# Patient Record
Sex: MALE | Race: WHITE | NOT HISPANIC OR LATINO | URBAN - METROPOLITAN AREA
[De-identification: names, ages, dates, MRNs, and addresses within clinical notes are randomized per-mention and may not be internally consistent; named-entity substitution may affect disease eponyms.]

---

## 2017-07-03 ENCOUNTER — INPATIENT (INPATIENT)
Facility: HOSPITAL | Age: 57
LOS: 6 days | Discharge: HOME | DRG: 885 | End: 2017-07-10
Attending: STUDENT IN AN ORGANIZED HEALTH CARE EDUCATION/TRAINING PROGRAM | Admitting: STUDENT IN AN ORGANIZED HEALTH CARE EDUCATION/TRAINING PROGRAM
Payer: MEDICARE

## 2017-07-03 VITALS
DIASTOLIC BLOOD PRESSURE: 103 MMHG | TEMPERATURE: 99 F | HEART RATE: 118 BPM | OXYGEN SATURATION: 96 % | SYSTOLIC BLOOD PRESSURE: 159 MMHG | WEIGHT: 195.11 LBS | RESPIRATION RATE: 16 BRPM | HEIGHT: 74 IN

## 2017-07-03 DIAGNOSIS — Z98.89 OTHER SPECIFIED POSTPROCEDURAL STATES: Chronic | ICD-10-CM

## 2017-07-03 DIAGNOSIS — F33.2 MAJOR DEPRESSIVE DISORDER, RECURRENT SEVERE WITHOUT PSYCHOTIC FEATURES: ICD-10-CM

## 2017-07-03 DIAGNOSIS — R69 ILLNESS, UNSPECIFIED: ICD-10-CM

## 2017-07-03 LAB
ANION GAP SERPL CALC-SCNC: 14 MMOL/L — SIGNIFICANT CHANGE UP (ref 5–17)
APPEARANCE UR: CLEAR — SIGNIFICANT CHANGE UP
BILIRUB UR-MCNC: NEGATIVE — SIGNIFICANT CHANGE UP
BUN SERPL-MCNC: 18 MG/DL — SIGNIFICANT CHANGE UP (ref 7–23)
CALCIUM SERPL-MCNC: 10 MG/DL — SIGNIFICANT CHANGE UP (ref 8.4–10.5)
CHLORIDE SERPL-SCNC: 102 MMOL/L — SIGNIFICANT CHANGE UP (ref 96–108)
CO2 SERPL-SCNC: 24 MMOL/L — SIGNIFICANT CHANGE UP (ref 22–31)
COLOR SPEC: YELLOW — SIGNIFICANT CHANGE UP
CREAT SERPL-MCNC: 0.9 MG/DL — SIGNIFICANT CHANGE UP (ref 0.5–1.3)
DIFF PNL FLD: (no result)
GLUCOSE SERPL-MCNC: 116 MG/DL — HIGH (ref 70–99)
GLUCOSE UR QL: NEGATIVE — SIGNIFICANT CHANGE UP
HCT VFR BLD CALC: 48.9 % — SIGNIFICANT CHANGE UP (ref 39–50)
HGB BLD-MCNC: 17.6 G/DL — HIGH (ref 13–17)
KETONES UR-MCNC: NEGATIVE — SIGNIFICANT CHANGE UP
LEUKOCYTE ESTERASE UR-ACNC: NEGATIVE — SIGNIFICANT CHANGE UP
MCHC RBC-ENTMCNC: 31.5 PG — SIGNIFICANT CHANGE UP (ref 27–34)
MCHC RBC-ENTMCNC: 36 G/DL — SIGNIFICANT CHANGE UP (ref 32–36)
MCV RBC AUTO: 87.6 FL — SIGNIFICANT CHANGE UP (ref 80–100)
NITRITE UR-MCNC: NEGATIVE — SIGNIFICANT CHANGE UP
PCP SPEC-MCNC: SIGNIFICANT CHANGE UP
PH UR: 5.5 — SIGNIFICANT CHANGE UP (ref 5–8)
PLATELET # BLD AUTO: 228 K/UL — SIGNIFICANT CHANGE UP (ref 150–400)
POTASSIUM SERPL-MCNC: 4.2 MMOL/L — SIGNIFICANT CHANGE UP (ref 3.5–5.3)
POTASSIUM SERPL-SCNC: 4.2 MMOL/L — SIGNIFICANT CHANGE UP (ref 3.5–5.3)
PROT UR-MCNC: NEGATIVE MG/DL — SIGNIFICANT CHANGE UP
RBC # BLD: 5.58 M/UL — SIGNIFICANT CHANGE UP (ref 4.2–5.8)
RBC # FLD: 12.6 % — SIGNIFICANT CHANGE UP (ref 10.3–16.9)
SODIUM SERPL-SCNC: 140 MMOL/L — SIGNIFICANT CHANGE UP (ref 135–145)
SP GR SPEC: >=1.03 — SIGNIFICANT CHANGE UP (ref 1–1.03)
UROBILINOGEN FLD QL: 0.2 E.U./DL — SIGNIFICANT CHANGE UP
WBC # BLD: 10.7 K/UL — HIGH (ref 3.8–10.5)
WBC # FLD AUTO: 10.7 K/UL — HIGH (ref 3.8–10.5)

## 2017-07-03 PROCEDURE — 99285 EMERGENCY DEPT VISIT HI MDM: CPT

## 2017-07-03 PROCEDURE — 99285 EMERGENCY DEPT VISIT HI MDM: CPT | Mod: 25

## 2017-07-03 PROCEDURE — 93010 ELECTROCARDIOGRAM REPORT: CPT

## 2017-07-03 RX ORDER — CLONAZEPAM 1 MG
0.5 TABLET ORAL
Qty: 0 | Refills: 0 | Status: DISCONTINUED | OUTPATIENT
Start: 2017-07-03 | End: 2017-07-04

## 2017-07-03 RX ORDER — DESVENLAFAXINE 50 MG/1
50 TABLET, EXTENDED RELEASE ORAL DAILY
Qty: 0 | Refills: 0 | Status: DISCONTINUED | OUTPATIENT
Start: 2017-07-03 | End: 2017-07-03

## 2017-07-03 RX ORDER — OXAZEPAM 10 MG
10 CAPSULE ORAL THREE TIMES A DAY
Qty: 0 | Refills: 0 | Status: DISCONTINUED | OUTPATIENT
Start: 2017-07-03 | End: 2017-07-03

## 2017-07-03 RX ORDER — QUETIAPINE FUMARATE 200 MG/1
25 TABLET, FILM COATED ORAL EVERY 6 HOURS
Qty: 0 | Refills: 0 | Status: DISCONTINUED | OUTPATIENT
Start: 2017-07-03 | End: 2017-07-07

## 2017-07-03 RX ORDER — DESVENLAFAXINE 50 MG/1
50 TABLET, EXTENDED RELEASE ORAL AT BEDTIME
Qty: 0 | Refills: 0 | Status: DISCONTINUED | OUTPATIENT
Start: 2017-07-03 | End: 2017-07-03

## 2017-07-03 RX ORDER — DESVENLAFAXINE 50 MG/1
50 TABLET, EXTENDED RELEASE ORAL DAILY
Qty: 0 | Refills: 0 | Status: DISCONTINUED | OUTPATIENT
Start: 2017-07-03 | End: 2017-07-10

## 2017-07-03 RX ORDER — LISINOPRIL 2.5 MG/1
20 TABLET ORAL ONCE
Qty: 0 | Refills: 0 | Status: COMPLETED | OUTPATIENT
Start: 2017-07-03 | End: 2017-07-03

## 2017-07-03 RX ORDER — QUETIAPINE FUMARATE 200 MG/1
12.5 TABLET, FILM COATED ORAL EVERY 4 HOURS
Qty: 0 | Refills: 0 | Status: DISCONTINUED | OUTPATIENT
Start: 2017-07-03 | End: 2017-07-03

## 2017-07-03 RX ORDER — SODIUM CHLORIDE 9 MG/ML
1000 INJECTION INTRAMUSCULAR; INTRAVENOUS; SUBCUTANEOUS ONCE
Qty: 0 | Refills: 0 | Status: COMPLETED | OUTPATIENT
Start: 2017-07-03 | End: 2017-07-03

## 2017-07-03 RX ORDER — QUETIAPINE FUMARATE 200 MG/1
12.5 TABLET, FILM COATED ORAL
Qty: 0 | Refills: 0 | Status: DISCONTINUED | OUTPATIENT
Start: 2017-07-03 | End: 2017-07-03

## 2017-07-03 RX ORDER — LISDEXAMFETAMINE DIMESYLATE 70 MG/1
70 CAPSULE ORAL DAILY
Qty: 0 | Refills: 0 | Status: DISCONTINUED | OUTPATIENT
Start: 2017-07-03 | End: 2017-07-04

## 2017-07-03 RX ORDER — QUETIAPINE FUMARATE 200 MG/1
25 TABLET, FILM COATED ORAL AT BEDTIME
Qty: 0 | Refills: 0 | Status: DISCONTINUED | OUTPATIENT
Start: 2017-07-03 | End: 2017-07-10

## 2017-07-03 RX ORDER — LISINOPRIL 2.5 MG/1
20 TABLET ORAL DAILY
Qty: 0 | Refills: 0 | Status: DISCONTINUED | OUTPATIENT
Start: 2017-07-03 | End: 2017-07-04

## 2017-07-03 RX ORDER — ATORVASTATIN CALCIUM 80 MG/1
10 TABLET, FILM COATED ORAL AT BEDTIME
Qty: 0 | Refills: 0 | Status: DISCONTINUED | OUTPATIENT
Start: 2017-07-03 | End: 2017-07-10

## 2017-07-03 RX ORDER — LISDEXAMFETAMINE DIMESYLATE 70 MG/1
70 CAPSULE ORAL DAILY
Qty: 0 | Refills: 0 | Status: DISCONTINUED | OUTPATIENT
Start: 2017-07-03 | End: 2017-07-03

## 2017-07-03 RX ADMIN — QUETIAPINE FUMARATE 12.5 MILLIGRAM(S): 200 TABLET, FILM COATED ORAL at 15:49

## 2017-07-03 RX ADMIN — LISINOPRIL 20 MILLIGRAM(S): 2.5 TABLET ORAL at 10:13

## 2017-07-03 RX ADMIN — SODIUM CHLORIDE 1000 MILLILITER(S): 9 INJECTION INTRAMUSCULAR; INTRAVENOUS; SUBCUTANEOUS at 10:13

## 2017-07-03 RX ADMIN — Medication 2 MILLIGRAM(S): at 10:13

## 2017-07-03 RX ADMIN — ATORVASTATIN CALCIUM 10 MILLIGRAM(S): 80 TABLET, FILM COATED ORAL at 22:21

## 2017-07-03 RX ADMIN — QUETIAPINE FUMARATE 25 MILLIGRAM(S): 200 TABLET, FILM COATED ORAL at 22:21

## 2017-07-03 RX ADMIN — DESVENLAFAXINE 50 MILLIGRAM(S): 50 TABLET, EXTENDED RELEASE ORAL at 17:26

## 2017-07-03 RX ADMIN — Medication 0.5 MILLIGRAM(S): at 22:21

## 2017-07-03 NOTE — ED BEHAVIORAL HEALTH ASSESSMENT NOTE - RISK ASSESSMENT
Pt is stable but has chronic risk based on severe depression, though has multiple protective factors including roberto, and hx w/o suicidality or hospitalization despite severity and tx refractoriness of condition.

## 2017-07-03 NOTE — ED BEHAVIORAL HEALTH ASSESSMENT NOTE - CURRENT MEDICATION
Vyvanse 70 mg daily; Pristiq 50 mg daily; Serax 30/30/15 mg; Seroquel- prescribed 12.5/ 25 mg most recently, but has been using with psychiatrist as needed 25 mg ~ 4x daily; lisinopril 20 mg daily; Lipitor 10 mg daily

## 2017-07-03 NOTE — ED PROVIDER NOTE - OBJECTIVE STATEMENT
58 y/o M w/ pmhx of YEHUDA and MDD for 30 years, HTN, HLD, presents to ED today for possible ECT treatment. Pt has been mostly bedridden > 1 month due to severe anxiety, fear, depression, lives w/ elderly parents, has spoken to both psychiatrist (Dr. Garay) and therapist (Emely Melgar) about possibility of ECT and is amenable. He states that Dr. Garay, his psychiatrist in [place], spoke to Dr. Fulton at [facility] about admission for ECT. He denies thoughts of hurting himself, SI/HI, denies hx of psychiatric admissions, denies hx of etoh/recreational drug use (former smoker, quit 5 years ago). This morning forgot to take lisinopril for HTN, has taken Vyvanse (which normally increases his heart rate) 70mg, quetiapine 25mg, as well as 30mg x 2 of oxazepam.    On review of systems, endorses current anxiety/depression, occasional SOB w/ anxiety attacks as well as occasional dizziness with medications. Denies fever, chest pain, nausea/vomiting, diarrhea/constipation, rash, headache/visual changes.

## 2017-07-03 NOTE — ED BEHAVIORAL HEALTH ASSESSMENT NOTE - DETAILS
as noted, passive suicidal ideation of not wanting to be on this earth, but not ruminative, no active suicidal ideation ever Zoloft, nortriptyline- cardiac informed nursing and covering attending d/w Dr. Garay, psychiatrist

## 2017-07-03 NOTE — ED PROVIDER NOTE - MEDICAL DECISION MAKING DETAILS
58 y/o M w/ pmhx of YEHUDA and MDD for 30 years, HTN, HLD, presents to ED today with severe anxiety/depression refractory to therapy/medications, sent in by psychiatrist for possible ECT treatment.  -Patient amenable to ECT treatment, no SI/HI, consult psychiatry and f/up recs  -Lisinopril 20mg x 1 (did not take today) for elevated BP, tachycardia likely 2/2 Vyvanse and/or dehydration - 1L NS bolus, ativan 2mg IV x 1 for anxiety (states oxazepam this AM did not help) 56 y/o M w/ pmhx of YEHUDA and MDD for 30 years, HTN, HLD, presents to ED today with severe anxiety/depression refractory to therapy/medications, sent in by psychiatrist for possible ECT treatment.  -Patient amenable to ECT treatment, no SI/HI, consult psychiatry and f/up recs  -Lisinopril 20mg x 1 (did not take today) for elevated BP, tachycardia likely 2/2 Vyvanse and/or dehydration - 1L NS bolus, ativan 2mg IV x 1 for anxiety (states oxazepam this AM did not help)  -VS improved on re-check, admit to psychiatry for ECT

## 2017-07-03 NOTE — ED BEHAVIORAL HEALTH ASSESSMENT NOTE - PSYCHIATRIC ISSUES AND PLAN (INCLUDE STANDING AND PRN MEDICATION)
plan to evaluate for inpatient ECT; Serax tapered to 10 mg TID for the remainder of today and tomorrow then further dosing to be addressed by inpatient team with prospect of treatment starting in 2 days; quetiapine ordered at 12.5 mg 9a/1p/5p for anxiety but at lower dose to limit sedation, and 25 mg at bedtime; c/w Vyvanse 70 mg daily for now and Pristiq 50 mg daily which is ordered to limit w/d but may be stopped; TCA (doxepin) vs. MAOI trial as pharmacologic trial I+/- maintenance ECT in the

## 2017-07-03 NOTE — ED BEHAVIORAL HEALTH ASSESSMENT NOTE - SUMMARY
56yo DWM on SSD x 1 year, with 3oy h/o TRD, severe, with fluctuating course including paroxysmal onset and recovery of episode per pt, and worse with prolonged 2mo episode described by pt since 14mo interval of relative euthymia after break-up with g.f. of 2 yrs ~ 5 mos ago, being admitted to pursue evaluation and for trial of treatment with inpatient ECT.

## 2017-07-03 NOTE — ED BEHAVIORAL HEALTH ASSESSMENT NOTE - DESCRIPTION (FIRST USE, LAST USE, QUANTITY, FREQUENCY, DURATION)
occasional, never routine or excessive, one glass of wine weekly with ex-g.f. at dinner, last 4 mos ago past experience, not regular or excessive; last 2 mos ago, first time as means for relaxation experimented with it 1-2x in lifetime, last a few yrs ago prescribed, multiple trials, ineffective, used as prescribed; no excessive or rec use or abuse

## 2017-07-03 NOTE — ED BEHAVIORAL HEALTH ASSESSMENT NOTE - MEDICAL ISSUES AND PLAN (INCLUDE STANDING AND PRN MEDICATION)
med consult for ECT clearance; continue home medication at current dose- lisinopril, of which dose was received in ED after initial VS which were rechecked and further stabilized, and Lipitor which is ordered for bedtime.

## 2017-07-03 NOTE — ED BEHAVIORAL HEALTH ASSESSMENT NOTE - HPI (INCLUDE ILLNESS QUALITY, SEVERITY, DURATION, TIMING, CONTEXT, MODIFYING FACTORS, ASSOCIATED SIGNS AND SYMPTOMS)
Patient has a 30yr h/o depression and anxiety with index MDE recalled as 1/1/86, about 6 mos after he was , and they have been treatment refractory and have become severe, initially lasting just weeks, once experienced longer/ for a couple mos, and he could go a year or more without any episodes, per pt report, but he claims that they have become more frequent and severe over the last 2 years, and he describes current episode as his most severe ever- "the worst in my life"- following a ~14yr interval of relative euthymia per report, and being active in such intervals as corroborated by psychiatrist who, however, suggests they are rare, persisting for 2 mos from which he describes that he "want[s] a break and to be left alone".  He further describes being "crippled and tormented", experiencing "heavy" anxiety and depressed mood, and being virtually 'bid-ridden' over that course.  He denies any suicidality or suicidal ideation over this course or in the past, though on prompting does indicate passive suicidal ideation when severely depressed, thinking that he "do[es]n't want to be on this earth", but denies ever being preoccupied or ruminative about this, has never had any active suicidal ideation, self-injurious behavior, or suicide attempt, and has never been psychiatrically hospitalized.  His psychiatrist corroborates this, indicating that he is fairly spiritual as a 'man of roberto'.  He claims that episodes can be paroxysmal in onset and recovery, but that they are often coupled to stressors as was the case in current instance wherein he had a break-up with a g.f of 2 yrs ~ 5 mos ago, noting that she was maritally  and did not have capacity in her life at this point for a relationship.  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  He reports having had extensive trials of antidepressants, including Sinequan which his psychiatrist indicates may have been the only partially effective agent, as his first trial, also Pamelor recently, as well as Zoloft- these noted particularly to have had AE though he indicates he generally has difficulty with tolerability, also Prozac, Celexa, Effexor x 25 years, Remeron, denies Cymbalta or Lexapro, as well as 2 courses of TMS- one which was effective and the other which was not, 'deep' TMS which was not effective, and ketamine of which he further indicated having had 8 treatments recently and 2 in the past, and his psychiatrist indicating possibly more, all ineffective, along with Cytomel, lithium, "every atypical antipsychotic as augmentation, and every combination of antidepressant" except MAOI which may be needed as pt also indicates reluctantly.  He has also been prescribed past stimulants, of which the pt indicates only past Dexedrine, as well as current Vyvanse which he claims yields ~ 1 hr partial effect in the morning, and he otherwise also indicates slight relief at times more in the nighttime.  Anxiety "can be worse than depression" and psychiatrist notes he's been on many benzos without effect as pt corroborates, currently on oxazepam 30/30/15 mg without effect, also on quetiapine prescribed at 12.5/25 mg but, as confirmed by psychiatrist, takes more at times, some 25 mg ~ 4x daily with some effect but excessive sedation. also Pristiq 50 mg daily as he had w/d sxs with full taper.  No h/o hypo/manic sxs.  Psychiatrist is in [place] near where pt lives, on , and is affiliated with [facility] where, however, there is a 2-3wk wait list to pursue ECT for which pt confirms he was referred in the past and now strongly recommended presently, so trial was d/w associates, Dr. Miller and Dr. Rao, at affiliate, [facility], where plan d/w Dr. Fulton, inpatient psychiatry director, was to pursue inpatient trial given geographic distance, issues with transport at home, and severity of his condition.  Pt denies any recent or consistent/ excessive substance use, including EtOH of which he'd consume 1 glass of wine nightly with ex-girlfriend but none x 4 mos, no w/d sxs, also occasional past MJ, including last 2 mos ago as first use as means attain relaxation but with limited benefit.  Pt is hoping and seeking reassurance about getting better.

## 2017-07-03 NOTE — ED PROVIDER NOTE - CARE PLAN
Principal Discharge DX:	Episode of recurrent major depressive disorder, unspecified depression episode severity  Secondary Diagnosis:	Generalized anxiety disorder

## 2017-07-03 NOTE — ED BEHAVIORAL HEALTH ASSESSMENT NOTE - OTHER PAST PSYCHIATRIC HISTORY (INCLUDE DETAILS REGARDING ONSET, COURSE OF ILLNESS, INPATIENT/OUTPATIENT TREATMENT)
30yr h/o depression and anxiety- episodic, severe and refractory  Psychiatrists describe MDE as additionally appearing "viral", and he has had elevated EBV titres, but other extensive w/u has been negative  No hospitalizations

## 2017-07-03 NOTE — ED PROVIDER NOTE - PMH
Episode of recurrent major depressive disorder, unspecified depression episode severity    Essential hypertension    Generalized anxiety disorder    Hyperlipidemia, unspecified hyperlipidemia type

## 2017-07-03 NOTE — ED PROVIDER NOTE - ATTENDING CONTRIBUTION TO CARE
67 m hx of anxiety/depressin- htn- bedridden for 1 month for severe anxiety- vivanz with pristiq and seroquel and oxazepam daily- sent in for ECT by Dr Fulton  no si/no thoughts of hurting himself  vss  s1s2 lungs cta bl  abd soft nt nd +bs  ext no c/c/e  IMP- Refractory Anxiety/Depression  baseline labs pysch consult

## 2017-07-03 NOTE — ED PROVIDER NOTE - PSYCHIATRIC, MLM
Alert and oriented to person, place, time/situation. Depressed mood and normal affect. no apparent risk to self or others.

## 2017-07-03 NOTE — ED BEHAVIORAL HEALTH ASSESSMENT NOTE - OTHER
termination of long-term relationship; disability severe treatment-refractory depression requiring treatment at inpatient level of care [id number]

## 2017-07-03 NOTE — ED BEHAVIORAL HEALTH ASSESSMENT NOTE - DESCRIPTION
calm, pleasant, in hallway WC, on 1:1. 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  Pt was  [phone] when he , described wife as "cold".  On SSD x1 yr, leaving job he'd had since graduating [employer] as  for [employer] in [facility] ( based in [place]).

## 2017-07-03 NOTE — ED BEHAVIORAL HEALTH ASSESSMENT NOTE - PAST PSYCHOTROPIC MEDICATION
As noted- He reports having had extensive trials of antidepressants, including Sinequan which his psychiatrist indicates may have been the only partially effective agent, as his first trial, also Pamelor recently, as well as Zoloft- these noted particularly to have had AE though he indicates he generally has difficulty with tolerability, also Prozac, Celexa, Effexor x 25 years, Remeron, denies Cymbalta or Lexapro, as well as 2 courses of TMS- one which was effective and the other which was not, 'deep' TMS which was not effective, and ketamine of which he further indicated having had 8 treatments recently and 2 in the past, and his psychiatrist indicating possibly more, all ineffective, along with Cytomel, lithium, "every atypical antipsychotic as augmentation, and every combination of antidepressant" except MAOI which may be needed as pt also indicates reluctantly.  He has also been prescribed past stimulants, of which the pt indicates only past Dexedrine,

## 2017-07-03 NOTE — ED BEHAVIORAL HEALTH NOTE - BEHAVIORAL HEALTH NOTE
ECT Attending Brief Note: Patient seen, chart reviewed.  Patient with treatment resistant depression here for trial of ECT.  Not functioning at home and failing outpatient treatment.  Agrees to Bifrontal ECT.  Awaiting med clearance.  IMP: ANDREEA recurrent, severe  REC: Bifrontal ECT #1 on 7/5/17 if med cleared.

## 2017-07-04 DIAGNOSIS — F41.1 GENERALIZED ANXIETY DISORDER: ICD-10-CM

## 2017-07-04 DIAGNOSIS — I10 ESSENTIAL (PRIMARY) HYPERTENSION: ICD-10-CM

## 2017-07-04 LAB — TSH SERPL-MCNC: 0.69 UIU/ML — SIGNIFICANT CHANGE UP (ref 0.35–4.94)

## 2017-07-04 PROCEDURE — 99232 SBSQ HOSP IP/OBS MODERATE 35: CPT

## 2017-07-04 RX ORDER — AMLODIPINE BESYLATE 2.5 MG/1
5 TABLET ORAL ONCE
Qty: 0 | Refills: 0 | Status: COMPLETED | OUTPATIENT
Start: 2017-07-04 | End: 2017-07-04

## 2017-07-04 RX ORDER — LISINOPRIL 2.5 MG/1
40 TABLET ORAL DAILY
Qty: 0 | Refills: 0 | Status: DISCONTINUED | OUTPATIENT
Start: 2017-07-04 | End: 2017-07-10

## 2017-07-04 RX ORDER — LISINOPRIL 2.5 MG/1
10 TABLET ORAL ONCE
Qty: 0 | Refills: 0 | Status: COMPLETED | OUTPATIENT
Start: 2017-07-04 | End: 2017-07-04

## 2017-07-04 RX ORDER — DEXTROAMPHETAMINE SACCHARATE, AMPHETAMINE ASPARTATE, DEXTROAMPHETAMINE SULFATE AND AMPHETAMINE SULFATE 1.875; 1.875; 1.875; 1.875 MG/1; MG/1; MG/1; MG/1
15 TABLET ORAL
Qty: 0 | Refills: 0 | Status: DISCONTINUED | OUTPATIENT
Start: 2017-07-04 | End: 2017-07-10

## 2017-07-04 RX ORDER — HYDRALAZINE HCL 50 MG
10 TABLET ORAL ONCE
Qty: 0 | Refills: 0 | Status: DISCONTINUED | OUTPATIENT
Start: 2017-07-04 | End: 2017-07-04

## 2017-07-04 RX ORDER — CLONAZEPAM 1 MG
0.5 TABLET ORAL AT BEDTIME
Qty: 0 | Refills: 0 | Status: DISCONTINUED | OUTPATIENT
Start: 2017-07-04 | End: 2017-07-04

## 2017-07-04 RX ORDER — DEXTROAMPHETAMINE SACCHARATE, AMPHETAMINE ASPARTATE, DEXTROAMPHETAMINE SULFATE AND AMPHETAMINE SULFATE 1.875; 1.875; 1.875; 1.875 MG/1; MG/1; MG/1; MG/1
20 TABLET ORAL
Qty: 0 | Refills: 0 | Status: DISCONTINUED | OUTPATIENT
Start: 2017-07-04 | End: 2017-07-04

## 2017-07-04 RX ADMIN — DESVENLAFAXINE 50 MILLIGRAM(S): 50 TABLET, EXTENDED RELEASE ORAL at 09:22

## 2017-07-04 RX ADMIN — QUETIAPINE FUMARATE 25 MILLIGRAM(S): 200 TABLET, FILM COATED ORAL at 21:49

## 2017-07-04 RX ADMIN — LISINOPRIL 20 MILLIGRAM(S): 2.5 TABLET ORAL at 08:03

## 2017-07-04 RX ADMIN — LISINOPRIL 10 MILLIGRAM(S): 2.5 TABLET ORAL at 17:58

## 2017-07-04 RX ADMIN — AMLODIPINE BESYLATE 5 MILLIGRAM(S): 2.5 TABLET ORAL at 21:49

## 2017-07-04 RX ADMIN — QUETIAPINE FUMARATE 25 MILLIGRAM(S): 200 TABLET, FILM COATED ORAL at 09:21

## 2017-07-04 RX ADMIN — DEXTROAMPHETAMINE SACCHARATE, AMPHETAMINE ASPARTATE, DEXTROAMPHETAMINE SULFATE AND AMPHETAMINE SULFATE 15 MILLIGRAM(S): 1.875; 1.875; 1.875; 1.875 TABLET ORAL at 10:46

## 2017-07-04 RX ADMIN — ATORVASTATIN CALCIUM 10 MILLIGRAM(S): 80 TABLET, FILM COATED ORAL at 21:49

## 2017-07-04 RX ADMIN — Medication 1 MILLIGRAM(S): at 16:10

## 2017-07-04 RX ADMIN — Medication 0.5 MILLIGRAM(S): at 09:20

## 2017-07-04 NOTE — PROGRESS NOTE BEHAVIORAL HEALTH - NSBHFUPINTERVALHXFT_PSY_A_CORE
Pt admitted last night transferred from [facility] for treatment refractory depression and anxiety, he was evaluated by Dr. Rao last night for ECT treatment and consented for first treatment Wednesday morning. Pt is noted to have sad affect and mood, he is cooperative with the interview.  He states that his depression and anxiety have been severe the past two years and led to impairment in every aspect of his life.  Pt has not noted any effects despite multiple treatment modalities including psychopharm, TMS, ketamine, and is looking forward to have ECT.  He states that the only medication helped his depression and anxiety with some effect is Vyvanse and low dose seroquel.  Due to hospital pharmacy does not have Vyvanse available, Adderall XR was ordered as substitute.  Pt denies AH/VH/HI, continues to endorse to passive SI with no plan/intent. He however, feels safe in the hospital setting.

## 2017-07-04 NOTE — PROGRESS NOTE BEHAVIORAL HEALTH - PROBLEM SELECTOR PLAN 2
continue klonopin 0.5mg po bid, stop after hs dose tonight, ECT tomorrow  Continue Seroquel 25mg po q6H prn for anxiety

## 2017-07-04 NOTE — PROGRESS NOTE BEHAVIORAL HEALTH - PROBLEM SELECTOR PLAN 1
Continue pristiq 50mg po daily  Start Adderall XR 15mg po daily to prevent crash from sudden stopping Vyvanse (pt takes Vyvanse 70mg daily at home, unavailable at pharmacy and pt cannot bring own supply)  Start b/l ECT #1 on 7/5/17 NPO after midnight  I/G/M therapy  discharge planning

## 2017-07-05 PROCEDURE — 99223 1ST HOSP IP/OBS HIGH 75: CPT | Mod: 25

## 2017-07-05 PROCEDURE — 90870 ELECTROCONVULSIVE THERAPY: CPT

## 2017-07-05 PROCEDURE — 99232 SBSQ HOSP IP/OBS MODERATE 35: CPT | Mod: GC

## 2017-07-05 RX ORDER — ATENOLOL 25 MG/1
25 TABLET ORAL DAILY
Qty: 0 | Refills: 0 | Status: DISCONTINUED | OUTPATIENT
Start: 2017-07-05 | End: 2017-07-07

## 2017-07-05 RX ORDER — IBUPROFEN 200 MG
600 TABLET ORAL EVERY 8 HOURS
Qty: 0 | Refills: 0 | Status: DISCONTINUED | OUTPATIENT
Start: 2017-07-05 | End: 2017-07-10

## 2017-07-05 RX ORDER — ACETAMINOPHEN 500 MG
1000 TABLET ORAL ONCE
Qty: 0 | Refills: 0 | Status: COMPLETED | OUTPATIENT
Start: 2017-07-05 | End: 2017-07-05

## 2017-07-05 RX ORDER — HYDRALAZINE HCL 50 MG
5 TABLET ORAL
Qty: 0 | Refills: 0 | Status: DISCONTINUED | OUTPATIENT
Start: 2017-07-05 | End: 2017-07-10

## 2017-07-05 RX ORDER — CLONAZEPAM 1 MG
1 TABLET ORAL
Qty: 0 | Refills: 0 | Status: DISCONTINUED | OUTPATIENT
Start: 2017-07-05 | End: 2017-07-10

## 2017-07-05 RX ADMIN — Medication 600 MILLIGRAM(S): at 13:45

## 2017-07-05 RX ADMIN — LISINOPRIL 40 MILLIGRAM(S): 2.5 TABLET ORAL at 06:17

## 2017-07-05 RX ADMIN — Medication 1000 MILLIGRAM(S): at 16:06

## 2017-07-05 RX ADMIN — ATENOLOL 25 MILLIGRAM(S): 25 TABLET ORAL at 12:33

## 2017-07-05 RX ADMIN — DESVENLAFAXINE 50 MILLIGRAM(S): 50 TABLET, EXTENDED RELEASE ORAL at 11:15

## 2017-07-05 RX ADMIN — QUETIAPINE FUMARATE 25 MILLIGRAM(S): 200 TABLET, FILM COATED ORAL at 21:09

## 2017-07-05 RX ADMIN — DEXTROAMPHETAMINE SACCHARATE, AMPHETAMINE ASPARTATE, DEXTROAMPHETAMINE SULFATE AND AMPHETAMINE SULFATE 15 MILLIGRAM(S): 1.875; 1.875; 1.875; 1.875 TABLET ORAL at 10:17

## 2017-07-05 RX ADMIN — ATORVASTATIN CALCIUM 10 MILLIGRAM(S): 80 TABLET, FILM COATED ORAL at 21:09

## 2017-07-05 RX ADMIN — Medication 1000 MILLIGRAM(S): at 19:51

## 2017-07-05 RX ADMIN — Medication 1 MILLIGRAM(S): at 21:09

## 2017-07-05 RX ADMIN — QUETIAPINE FUMARATE 25 MILLIGRAM(S): 200 TABLET, FILM COATED ORAL at 11:15

## 2017-07-05 RX ADMIN — QUETIAPINE FUMARATE 25 MILLIGRAM(S): 200 TABLET, FILM COATED ORAL at 18:30

## 2017-07-05 RX ADMIN — Medication 600 MILLIGRAM(S): at 12:32

## 2017-07-06 PROCEDURE — 99232 SBSQ HOSP IP/OBS MODERATE 35: CPT | Mod: GC

## 2017-07-06 PROCEDURE — 99232 SBSQ HOSP IP/OBS MODERATE 35: CPT

## 2017-07-06 RX ADMIN — Medication 600 MILLIGRAM(S): at 03:56

## 2017-07-06 RX ADMIN — LISINOPRIL 40 MILLIGRAM(S): 2.5 TABLET ORAL at 10:04

## 2017-07-06 RX ADMIN — QUETIAPINE FUMARATE 25 MILLIGRAM(S): 200 TABLET, FILM COATED ORAL at 10:45

## 2017-07-06 RX ADMIN — QUETIAPINE FUMARATE 25 MILLIGRAM(S): 200 TABLET, FILM COATED ORAL at 22:04

## 2017-07-06 RX ADMIN — QUETIAPINE FUMARATE 25 MILLIGRAM(S): 200 TABLET, FILM COATED ORAL at 16:12

## 2017-07-06 RX ADMIN — Medication 1 MILLIGRAM(S): at 09:59

## 2017-07-06 RX ADMIN — QUETIAPINE FUMARATE 25 MILLIGRAM(S): 200 TABLET, FILM COATED ORAL at 03:56

## 2017-07-06 RX ADMIN — Medication 600 MILLIGRAM(S): at 03:57

## 2017-07-06 RX ADMIN — DESVENLAFAXINE 50 MILLIGRAM(S): 50 TABLET, EXTENDED RELEASE ORAL at 10:04

## 2017-07-06 RX ADMIN — DEXTROAMPHETAMINE SACCHARATE, AMPHETAMINE ASPARTATE, DEXTROAMPHETAMINE SULFATE AND AMPHETAMINE SULFATE 15 MILLIGRAM(S): 1.875; 1.875; 1.875; 1.875 TABLET ORAL at 07:28

## 2017-07-06 RX ADMIN — ATENOLOL 25 MILLIGRAM(S): 25 TABLET ORAL at 09:59

## 2017-07-06 RX ADMIN — ATORVASTATIN CALCIUM 10 MILLIGRAM(S): 80 TABLET, FILM COATED ORAL at 22:04

## 2017-07-07 DIAGNOSIS — E78.5 HYPERLIPIDEMIA, UNSPECIFIED: ICD-10-CM

## 2017-07-07 DIAGNOSIS — Z78.9 OTHER SPECIFIED HEALTH STATUS: ICD-10-CM

## 2017-07-07 PROCEDURE — 99232 SBSQ HOSP IP/OBS MODERATE 35: CPT | Mod: 25

## 2017-07-07 PROCEDURE — 90870 ELECTROCONVULSIVE THERAPY: CPT

## 2017-07-07 PROCEDURE — 99232 SBSQ HOSP IP/OBS MODERATE 35: CPT | Mod: GC

## 2017-07-07 RX ORDER — HYDROXYZINE HCL 10 MG
50 TABLET ORAL EVERY 6 HOURS
Qty: 0 | Refills: 0 | Status: DISCONTINUED | OUTPATIENT
Start: 2017-07-07 | End: 2017-07-10

## 2017-07-07 RX ORDER — ATENOLOL 25 MG/1
50 TABLET ORAL DAILY
Qty: 0 | Refills: 0 | Status: DISCONTINUED | OUTPATIENT
Start: 2017-07-08 | End: 2017-07-10

## 2017-07-07 RX ORDER — QUETIAPINE FUMARATE 200 MG/1
50 TABLET, FILM COATED ORAL EVERY 4 HOURS
Qty: 0 | Refills: 0 | Status: DISCONTINUED | OUTPATIENT
Start: 2017-07-07 | End: 2017-07-07

## 2017-07-07 RX ORDER — QUETIAPINE FUMARATE 200 MG/1
25 TABLET, FILM COATED ORAL EVERY 4 HOURS
Qty: 0 | Refills: 0 | Status: DISCONTINUED | OUTPATIENT
Start: 2017-07-07 | End: 2017-07-10

## 2017-07-07 RX ORDER — ATENOLOL 25 MG/1
25 TABLET ORAL ONCE
Qty: 0 | Refills: 0 | Status: COMPLETED | OUTPATIENT
Start: 2017-07-07 | End: 2017-07-07

## 2017-07-07 RX ORDER — MIDAZOLAM HYDROCHLORIDE 1 MG/ML
2 INJECTION, SOLUTION INTRAMUSCULAR; INTRAVENOUS
Qty: 0 | Refills: 0 | Status: DISCONTINUED | OUTPATIENT
Start: 2017-07-07 | End: 2017-07-07

## 2017-07-07 RX ORDER — NICOTINE POLACRILEX 2 MG
2 GUM BUCCAL
Qty: 0 | Refills: 0 | Status: DISCONTINUED | OUTPATIENT
Start: 2017-07-07 | End: 2017-07-10

## 2017-07-07 RX ADMIN — DEXTROAMPHETAMINE SACCHARATE, AMPHETAMINE ASPARTATE, DEXTROAMPHETAMINE SULFATE AND AMPHETAMINE SULFATE 15 MILLIGRAM(S): 1.875; 1.875; 1.875; 1.875 TABLET ORAL at 09:19

## 2017-07-07 RX ADMIN — Medication 50 MILLIGRAM(S): at 21:51

## 2017-07-07 RX ADMIN — MIDAZOLAM HYDROCHLORIDE 2 MILLIGRAM(S): 1 INJECTION, SOLUTION INTRAMUSCULAR; INTRAVENOUS at 08:30

## 2017-07-07 RX ADMIN — ATENOLOL 25 MILLIGRAM(S): 25 TABLET ORAL at 06:32

## 2017-07-07 RX ADMIN — Medication 2 MILLIGRAM(S): at 18:22

## 2017-07-07 RX ADMIN — Medication 2 MILLIGRAM(S): at 14:01

## 2017-07-07 RX ADMIN — DESVENLAFAXINE 50 MILLIGRAM(S): 50 TABLET, EXTENDED RELEASE ORAL at 10:04

## 2017-07-07 RX ADMIN — LISINOPRIL 40 MILLIGRAM(S): 2.5 TABLET ORAL at 06:32

## 2017-07-07 RX ADMIN — ATORVASTATIN CALCIUM 10 MILLIGRAM(S): 80 TABLET, FILM COATED ORAL at 21:52

## 2017-07-07 RX ADMIN — QUETIAPINE FUMARATE 25 MILLIGRAM(S): 200 TABLET, FILM COATED ORAL at 10:04

## 2017-07-07 RX ADMIN — QUETIAPINE FUMARATE 25 MILLIGRAM(S): 200 TABLET, FILM COATED ORAL at 18:22

## 2017-07-07 RX ADMIN — Medication 1 MILLIGRAM(S): at 21:52

## 2017-07-07 RX ADMIN — Medication 1 MILLIGRAM(S): at 09:19

## 2017-07-07 RX ADMIN — Medication 50 MILLIGRAM(S): at 14:01

## 2017-07-07 RX ADMIN — ATENOLOL 25 MILLIGRAM(S): 25 TABLET ORAL at 12:46

## 2017-07-07 RX ADMIN — QUETIAPINE FUMARATE 25 MILLIGRAM(S): 200 TABLET, FILM COATED ORAL at 03:48

## 2017-07-07 RX ADMIN — QUETIAPINE FUMARATE 25 MILLIGRAM(S): 200 TABLET, FILM COATED ORAL at 21:52

## 2017-07-07 RX ADMIN — QUETIAPINE FUMARATE 50 MILLIGRAM(S): 200 TABLET, FILM COATED ORAL at 14:01

## 2017-07-07 NOTE — PROGRESS NOTE BEHAVIORAL HEALTH - SUMMARY
57y M w/treatment resistant depression/anxiety (multiple med trials, TMS, Ketamine), no past psych hospitalization, transferred from [facility] for ECT; after he was referred for worsening depressive symptoms over 3mths in the context of relationship loss. Pt has no hx of manic episodes/past suicide or self-harm attempts; is presently not suicidal/homicidal/psychotic. He is alert/oriented and has good insight into his condition. Considering how his symptoms have been restricted to the depressive cluster with anxiety, a diagnosis of severe MDD with anxious distress is more likely, as opposed to Bipolar/Schizoprhenia/Schizoaffective.This episode was likely precipitated/aggravated by recent relationship loss against the larger backdrop of being a , educated male who has been unemployed for almost a year. Received 2 ECT treatments, compliant with medications, depression and anxiety continue to be evident at about the same degree of severity although pt. appears to be coming to terms with the fact that he requires IP hospitalization at present for ECT treatment/BP monitoring/medication titration as needed.
56 yo  male with treatment refractory depressive and anxiety symptoms not responding to multiple treatment modalities including psychopharmacology, TMS, ketamines, admitted for stabilization and treatment of ECT.  Pt will benefit from taking medications stated in plan, trial of b/l ECT, and continued hospitalization considering severity and duration of current unresolved episode of affective symptoms.

## 2017-07-07 NOTE — PROGRESS NOTE BEHAVIORAL HEALTH - NSBHFUPINTERVALHXFT_PSY_A_CORE
Pt. had second round of ECT today around 8AM; BP in AM was elevated (167/110mmHg 74/m -- see PLAN); largely remains in room, does not interact freely with other patients/staff members.Mood continues to be depressed;expressed worries about remaining depressed for a long time to come; writer discussed nature of ECT treatment and encouraged pt. to give the treatment some time to start working.Less discharge focussed; appreciates need for IP stay at least until he receives 3-4 treatments; sleep disturbed (frequent awakening); anxiety levels about the same as yesterday (requires Seroquel PRNs 3-4x per day); denied SI/HI/AH/VH; no delusions noted.

## 2017-07-07 NOTE — PROGRESS NOTE BEHAVIORAL HEALTH - NSBHADMITMEDEDUDETAILS_A_CORE FT
Seroquel and Hydroxyzine for anxiety as PRNs and why BZDs are being limited in light of ECT treatment Seroquel and Hydroxyzine for anxiety as PRNs and why BZDs are being limited in light of ECT treatment.

## 2017-07-07 NOTE — PROGRESS NOTE BEHAVIORAL HEALTH - NSBHFUPSUICINTERVALFT_PSY_A_CORE
Passive SI intermittently; strong protective factor : spiri Passive SI intermittently in recent past but denies at present; strong protective factor : spiritual/Uatsdin convictions

## 2017-07-07 NOTE — PROGRESS NOTE BEHAVIORAL HEALTH - PROBLEM SELECTOR PLAN 3
c/w Atorvastatin 10mg PO daily
Continue lisinopril 20mg po daily, continue to monitor BP and other vital signs.

## 2017-07-07 NOTE — PROGRESS NOTE BEHAVIORAL HEALTH - PROBLEM SELECTOR PLAN 1
-s/p first Rx on 7/5/17, second Rx on 7/7/17; next Rx on Monday 7/10/17 (Hold PM clonapin prior to Rx; NPO from midnight; c/w HTN meds)  -c/w Pristiq 50mg PO daily; Vyvanse switched to Adderall XR at admission. c/w latter at 15mg PO daily  -c/w Seroquel 25mg PO QHS , CHANGE Seroquel 25mg Q6HPRN to Q4HPRN   -c/w hydroxyzine 50mg q6h prn for anxiety on 7/6  -I/G/M therapy  -Disposition planning : SW looking into OP ECT options with goal of DCing pt. sometime next week. -s/p ECT #1 bifrontal on 7/5/17, ECT #2 bifrontal on 7/7/17; next Rx on Monday 7/10/17 (Hold PM clonapin prior to Rx; NPO from midnight; c/w HTN meds in am prior to treatment)  -c/w Pristiq 50mg PO daily; Vyvanse switched to Adderall XR at admission. c/w latter at 15mg PO daily  -c/w Seroquel 25mg PO QHS , CHANGE Seroquel 25mg Q6HPRN to Q4HPRN to better address anxiety  -c/w hydroxyzine 50mg q6h prn for anxiety on 7/6  -I/G/M therapy  -Disposition planning : SW looking into outpatient ECT options with goal of DCing pt. sometime next week.  -f/u weekly BDI, MMSE

## 2017-07-07 NOTE — PROGRESS NOTE BEHAVIORAL HEALTH - RISK ASSESSMENT
Hx of suicidality : no attempts; only passive SI intermittently prior to admission  Current Suicidality : no ideation/intent/plan  Risk Factors :   1-Static : ; age  2-Dynamic : Depression, Single, psychosocial situation (recent breakup, social withdrawal)  Risk Formulation :   Not an imminent threat to self, does not require CO. Modifiable risk factors include Rx of depression and enabling him to develop better coping skills/methods to change his psychosocial situation
Pt's protective factors include good insight, receptive to treatments, adherence to treatment, however, he has the following risk factors:  treatment refractory depression and anxiety, current mood episode, psychosocial stressors include break up with gf few months ago, inability to engage in employment or relationship due to mental illness.

## 2017-07-07 NOTE — PROGRESS NOTE BEHAVIORAL HEALTH - NSBHADMITIPOBSFT_PSY_A_CORE
Pt does not have active SI
patient denies suicidal ideation, intent or plan.  feels safe on the unit.  no current indication for 1:1 observation at this time.

## 2017-07-07 NOTE — PROGRESS NOTE BEHAVIORAL HEALTH - PROBLEM SELECTOR PLAN 2
Medicine consult recs appreciated. Received one STAT dose Atenolol 25mg today morning.  Increased Standing dose of Atenolol to 50mg PO and re-timed it to noon  c/w Lisinopril 40mg PO QAM  Continue monitoring VS and make changes as needed Medicine consult recs appreciated. Received one stat dose Atenolol 25mg today morning after ECT to better control BP  Increased Standing dose of Atenolol to 50mg PO and re-timed it to noon  c/w Lisinopril 40mg PO QAM  Continue monitoring VS and make changes as needed

## 2017-07-07 NOTE — PROGRESS NOTE BEHAVIORAL HEALTH - PROBLEM SELECTOR PROBLEM 1
Major depressive disorder, recurrent episode, severe with anxious distress
Major depressive disorder, recurrent episode, severe with anxious distress

## 2017-07-08 LAB
CHOLEST SERPL-MCNC: 144 MG/DL — SIGNIFICANT CHANGE UP (ref 10–199)
HBA1C BLD-MCNC: 5.6 % — SIGNIFICANT CHANGE UP (ref 4–5.6)
HDLC SERPL-MCNC: 33 MG/DL — LOW (ref 40–125)
LIPID PNL WITH DIRECT LDL SERPL: 79 MG/DL — SIGNIFICANT CHANGE UP
TOTAL CHOLESTEROL/HDL RATIO MEASUREMENT: 4.4 RATIO — SIGNIFICANT CHANGE UP (ref 3.4–9.6)
TRIGL SERPL-MCNC: 162 MG/DL — HIGH (ref 10–149)

## 2017-07-08 RX ORDER — BENZOCAINE AND MENTHOL 5; 1 G/100ML; G/100ML
1 LIQUID ORAL EVERY 12 HOURS
Qty: 0 | Refills: 0 | Status: DISCONTINUED | OUTPATIENT
Start: 2017-07-08 | End: 2017-07-09

## 2017-07-08 RX ADMIN — Medication 600 MILLIGRAM(S): at 21:42

## 2017-07-08 RX ADMIN — Medication 600 MILLIGRAM(S): at 05:11

## 2017-07-08 RX ADMIN — QUETIAPINE FUMARATE 25 MILLIGRAM(S): 200 TABLET, FILM COATED ORAL at 18:05

## 2017-07-08 RX ADMIN — Medication 1 MILLIGRAM(S): at 09:53

## 2017-07-08 RX ADMIN — QUETIAPINE FUMARATE 25 MILLIGRAM(S): 200 TABLET, FILM COATED ORAL at 12:26

## 2017-07-08 RX ADMIN — ATORVASTATIN CALCIUM 10 MILLIGRAM(S): 80 TABLET, FILM COATED ORAL at 21:42

## 2017-07-08 RX ADMIN — Medication 600 MILLIGRAM(S): at 22:25

## 2017-07-08 RX ADMIN — DEXTROAMPHETAMINE SACCHARATE, AMPHETAMINE ASPARTATE, DEXTROAMPHETAMINE SULFATE AND AMPHETAMINE SULFATE 15 MILLIGRAM(S): 1.875; 1.875; 1.875; 1.875 TABLET ORAL at 09:52

## 2017-07-08 RX ADMIN — Medication 2 MILLIGRAM(S): at 18:06

## 2017-07-08 RX ADMIN — DESVENLAFAXINE 50 MILLIGRAM(S): 50 TABLET, EXTENDED RELEASE ORAL at 11:19

## 2017-07-08 RX ADMIN — Medication 1 MILLIGRAM(S): at 21:41

## 2017-07-08 RX ADMIN — Medication 600 MILLIGRAM(S): at 06:15

## 2017-07-08 RX ADMIN — QUETIAPINE FUMARATE 25 MILLIGRAM(S): 200 TABLET, FILM COATED ORAL at 05:11

## 2017-07-08 RX ADMIN — QUETIAPINE FUMARATE 25 MILLIGRAM(S): 200 TABLET, FILM COATED ORAL at 21:41

## 2017-07-08 RX ADMIN — LISINOPRIL 40 MILLIGRAM(S): 2.5 TABLET ORAL at 11:20

## 2017-07-09 RX ORDER — SODIUM CHLORIDE 0.65 %
1 AEROSOL, SPRAY (ML) NASAL EVERY 12 HOURS
Qty: 0 | Refills: 0 | Status: DISCONTINUED | OUTPATIENT
Start: 2017-07-09 | End: 2017-07-10

## 2017-07-09 RX ORDER — BENZOCAINE AND MENTHOL 5; 1 G/100ML; G/100ML
1 LIQUID ORAL
Qty: 0 | Refills: 0 | Status: DISCONTINUED | OUTPATIENT
Start: 2017-07-09 | End: 2017-07-10

## 2017-07-09 RX ADMIN — Medication 600 MILLIGRAM(S): at 14:01

## 2017-07-09 RX ADMIN — Medication 600 MILLIGRAM(S): at 06:45

## 2017-07-09 RX ADMIN — QUETIAPINE FUMARATE 25 MILLIGRAM(S): 200 TABLET, FILM COATED ORAL at 15:11

## 2017-07-09 RX ADMIN — QUETIAPINE FUMARATE 25 MILLIGRAM(S): 200 TABLET, FILM COATED ORAL at 21:51

## 2017-07-09 RX ADMIN — Medication 50 MILLIGRAM(S): at 21:51

## 2017-07-09 RX ADMIN — QUETIAPINE FUMARATE 25 MILLIGRAM(S): 200 TABLET, FILM COATED ORAL at 10:25

## 2017-07-09 RX ADMIN — BENZOCAINE AND MENTHOL 1 LOZENGE: 5; 1 LIQUID ORAL at 14:09

## 2017-07-09 RX ADMIN — Medication 600 MILLIGRAM(S): at 21:51

## 2017-07-09 RX ADMIN — Medication 600 MILLIGRAM(S): at 06:07

## 2017-07-09 RX ADMIN — Medication 600 MILLIGRAM(S): at 22:33

## 2017-07-09 RX ADMIN — ATORVASTATIN CALCIUM 10 MILLIGRAM(S): 80 TABLET, FILM COATED ORAL at 21:51

## 2017-07-09 RX ADMIN — DESVENLAFAXINE 50 MILLIGRAM(S): 50 TABLET, EXTENDED RELEASE ORAL at 14:03

## 2017-07-09 RX ADMIN — Medication 1 MILLIGRAM(S): at 10:26

## 2017-07-09 RX ADMIN — Medication 600 MILLIGRAM(S): at 14:07

## 2017-07-09 RX ADMIN — Medication 2 MILLIGRAM(S): at 19:56

## 2017-07-09 RX ADMIN — BENZOCAINE AND MENTHOL 1 LOZENGE: 5; 1 LIQUID ORAL at 19:56

## 2017-07-09 RX ADMIN — ATENOLOL 50 MILLIGRAM(S): 25 TABLET ORAL at 10:25

## 2017-07-09 RX ADMIN — LISINOPRIL 40 MILLIGRAM(S): 2.5 TABLET ORAL at 10:26

## 2017-07-09 RX ADMIN — DEXTROAMPHETAMINE SACCHARATE, AMPHETAMINE ASPARTATE, DEXTROAMPHETAMINE SULFATE AND AMPHETAMINE SULFATE 15 MILLIGRAM(S): 1.875; 1.875; 1.875; 1.875 TABLET ORAL at 14:06

## 2017-07-09 RX ADMIN — BENZOCAINE AND MENTHOL 1 LOZENGE: 5; 1 LIQUID ORAL at 21:51

## 2017-07-09 RX ADMIN — QUETIAPINE FUMARATE 25 MILLIGRAM(S): 200 TABLET, FILM COATED ORAL at 06:07

## 2017-07-09 RX ADMIN — Medication 2 MILLIGRAM(S): at 15:11

## 2017-07-10 VITALS
DIASTOLIC BLOOD PRESSURE: 111 MMHG | HEART RATE: 78 BPM | SYSTOLIC BLOOD PRESSURE: 165 MMHG | TEMPERATURE: 99 F | RESPIRATION RATE: 20 BRPM

## 2017-07-10 PROCEDURE — 99232 SBSQ HOSP IP/OBS MODERATE 35: CPT | Mod: GC

## 2017-07-10 PROCEDURE — 90870 ELECTROCONVULSIVE THERAPY: CPT

## 2017-07-10 PROCEDURE — 99238 HOSP IP/OBS DSCHRG MGMT 30/<: CPT | Mod: 25

## 2017-07-10 RX ORDER — ATENOLOL 25 MG/1
100 TABLET ORAL DAILY
Qty: 0 | Refills: 0 | Status: DISCONTINUED | OUTPATIENT
Start: 2017-07-11 | End: 2017-07-10

## 2017-07-10 RX ORDER — QUETIAPINE FUMARATE 200 MG/1
25 TABLET, FILM COATED ORAL ONCE
Qty: 0 | Refills: 0 | Status: COMPLETED | OUTPATIENT
Start: 2017-07-10 | End: 2017-07-10

## 2017-07-10 RX ORDER — ATENOLOL 25 MG/1
50 TABLET ORAL ONCE
Qty: 0 | Refills: 0 | Status: COMPLETED | OUTPATIENT
Start: 2017-07-10 | End: 2017-07-10

## 2017-07-10 RX ORDER — HYDROXYZINE HCL 10 MG
1 TABLET ORAL
Qty: 0 | Refills: 0 | COMMUNITY
Start: 2017-07-10

## 2017-07-10 RX ORDER — LISINOPRIL 2.5 MG/1
1 TABLET ORAL
Qty: 30 | Refills: 0 | OUTPATIENT
Start: 2017-07-10 | End: 2017-08-09

## 2017-07-10 RX ORDER — QUETIAPINE FUMARATE 200 MG/1
1 TABLET, FILM COATED ORAL
Qty: 0 | Refills: 0 | COMMUNITY
Start: 2017-07-10

## 2017-07-10 RX ORDER — DESVENLAFAXINE 50 MG/1
1 TABLET, EXTENDED RELEASE ORAL
Qty: 0 | Refills: 0 | COMMUNITY
Start: 2017-07-10

## 2017-07-10 RX ORDER — ATORVASTATIN CALCIUM 80 MG/1
1 TABLET, FILM COATED ORAL
Qty: 30 | Refills: 0 | OUTPATIENT
Start: 2017-07-10 | End: 2017-08-09

## 2017-07-10 RX ORDER — ATENOLOL 25 MG/1
1 TABLET ORAL
Qty: 30 | Refills: 0 | OUTPATIENT
Start: 2017-07-10 | End: 2017-08-09

## 2017-07-10 RX ADMIN — BENZOCAINE AND MENTHOL 1 LOZENGE: 5; 1 LIQUID ORAL at 00:33

## 2017-07-10 RX ADMIN — Medication 1 SPRAY(S): at 11:13

## 2017-07-10 RX ADMIN — Medication 1 MILLIGRAM(S): at 10:01

## 2017-07-10 RX ADMIN — QUETIAPINE FUMARATE 25 MILLIGRAM(S): 200 TABLET, FILM COATED ORAL at 11:13

## 2017-07-10 RX ADMIN — ATENOLOL 50 MILLIGRAM(S): 25 TABLET ORAL at 06:13

## 2017-07-10 RX ADMIN — LISINOPRIL 40 MILLIGRAM(S): 2.5 TABLET ORAL at 06:13

## 2017-07-10 RX ADMIN — DEXTROAMPHETAMINE SACCHARATE, AMPHETAMINE ASPARTATE, DEXTROAMPHETAMINE SULFATE AND AMPHETAMINE SULFATE 15 MILLIGRAM(S): 1.875; 1.875; 1.875; 1.875 TABLET ORAL at 10:02

## 2017-07-10 RX ADMIN — ATENOLOL 50 MILLIGRAM(S): 25 TABLET ORAL at 15:23

## 2017-07-10 RX ADMIN — Medication 600 MILLIGRAM(S): at 11:54

## 2017-07-10 RX ADMIN — DESVENLAFAXINE 50 MILLIGRAM(S): 50 TABLET, EXTENDED RELEASE ORAL at 11:03

## 2017-07-10 RX ADMIN — Medication 600 MILLIGRAM(S): at 11:13

## 2017-07-10 RX ADMIN — QUETIAPINE FUMARATE 25 MILLIGRAM(S): 200 TABLET, FILM COATED ORAL at 10:01

## 2017-07-10 RX ADMIN — BENZOCAINE AND MENTHOL 1 LOZENGE: 5; 1 LIQUID ORAL at 15:23

## 2017-07-10 RX ADMIN — Medication 2 MILLIGRAM(S): at 11:13

## 2017-07-10 RX ADMIN — BENZOCAINE AND MENTHOL 1 LOZENGE: 5; 1 LIQUID ORAL at 11:13

## 2017-07-10 NOTE — PROGRESS NOTE BEHAVIORAL HEALTH - CASE SUMMARY
57y M w/treatment resistant depression/anxiety (multiple med trials, TMS, Ketamine), no past psych hospitalization, transferred from [facility] for ECT; after he was referred for worsening depressive symptoms over 3mths in the context of relationship loss. Pt has no hx of manic episodes/past suicide or self-harm attempts; is presently not suicidal/homicidal/psychotic. He is alert/oriented and has good insight into his condition. This episode was likely precipitated/aggravated by recent relationship loss against the larger backdrop of being a , educated male who has been unemployed for almost a year.  Considering how his symptoms have been restricted to the depressive cluster with anxiety, a diagnosis of severe MDD with anxious distress is most likely. Received 3 ECT treatments, compliant with medications, Patient endorses anxiety and depressed mood, though improved from admission.  He reiterates feeling worse simply because he is in the hospital and is focused on discharge.  He denies suicidal ideation, intent or plan.    Patient's outpt psychiatrist and psychologist note comfort level with discharge today.  He is at low acute risk of harm to self/others at this time.  No suicidality/homicidality/psychosis prior to admission.  Mood/anxiety somewhat improved, with further room for improvement on an outpatietn basis.  He does not meet criteria for involuntary inpatient psychiatric hospitalization at this time.  Agreeable to continued ECT treatments on an outpt basis.  Appreciate medicine consult recs for HTN.  Supportive tx.  Discharge today to [facility] with outpatient maintenance ECT.
57y M w/treatment resistant depression/anxiety (multiple med trials, TMS, Ketamine), no past psych hospitalization, transferred from [facility] for ECT; after he was referred for worsening depressive symptoms over 3mths in the context of relationship loss. Pt has no hx of manic episodes/past suicide or self-harm attempts; is presently not suicidal/homicidal/psychotic. He is alert/oriented and has good insight into his condition. Considering how his symptoms have been restricted to the depressive cluster with anxiety, a diagnosis of severe MDD with anxious distress is most likely.  This episode was likely precipitated/aggravated by recent relationship loss against the larger backdrop of being a , educated male who has been unemployed for almost a year. Received 2 ECT treatments, compliant with medications, depression and anxiety continue to be evident at about the same degree of severity although pt. appears to be coming to terms with the fact that he requires IP hospitalization at present for ECT treatment/BP monitoring/medication titration as needed. Patient continues to endorse anxiety and depressed mood.  Negativistic.  Agreeable to continued ECT treatments.  ECT #3 on monday.  NPO after midnight.  increase seroquel to 25mg q4h prn anxiety.  Appreciate medicine consult recs for HTN.  I/G/M tx.  Supportive tx.  Discharge planning, likely discharge next week with outpatient maintenance ECT.

## 2017-07-10 NOTE — PROGRESS NOTE BEHAVIORAL HEALTH - NSBHATTESTSEENBY_PSY_A_CORE
Attending Psychiatrist supervising NP/Trainee, meeting pt... NP with telephonic supervision from Attending Psychiatrist

## 2017-07-10 NOTE — PROGRESS NOTE BEHAVIORAL HEALTH - THOUGHT CONTENT
Hopelessness/Preoccupations/Ruminations
Preoccupations/Suicidality
Ruminations/Hopelessness/Preoccupations

## 2017-07-10 NOTE — PROGRESS NOTE BEHAVIORAL HEALTH - NSBHADMITCOUNSEL_PSY_A_CORE
importance of adherence to chosen treatment/prognosis/diagnostic results/impressions and/or recommended studies/risks and benefits of treatment options/instructions for management, treatment and follow up/risk factor reduction/client/family/caregiver education
importance of adherence to chosen treatment/instructions for management, treatment and follow up/risk factor reduction/diagnostic results/impressions and/or recommended studies/risks and benefits of treatment options/client/family/caregiver education/prognosis

## 2017-07-11 PROCEDURE — 96374 THER/PROPH/DIAG INJ IV PUSH: CPT

## 2017-07-11 PROCEDURE — 80048 BASIC METABOLIC PNL TOTAL CA: CPT

## 2017-07-11 PROCEDURE — 84443 ASSAY THYROID STIM HORMONE: CPT

## 2017-07-11 PROCEDURE — 90870 ELECTROCONVULSIVE THERAPY: CPT

## 2017-07-11 PROCEDURE — 93005 ELECTROCARDIOGRAM TRACING: CPT

## 2017-07-11 PROCEDURE — 80061 LIPID PANEL: CPT

## 2017-07-11 PROCEDURE — 83036 HEMOGLOBIN GLYCOSYLATED A1C: CPT

## 2017-07-11 PROCEDURE — 80076 HEPATIC FUNCTION PANEL: CPT

## 2017-07-11 PROCEDURE — 81001 URINALYSIS AUTO W/SCOPE: CPT

## 2017-07-11 PROCEDURE — 80307 DRUG TEST PRSMV CHEM ANLYZR: CPT

## 2017-07-11 PROCEDURE — 99285 EMERGENCY DEPT VISIT HI MDM: CPT | Mod: 25

## 2017-07-11 PROCEDURE — 85027 COMPLETE CBC AUTOMATED: CPT

## 2017-07-11 PROCEDURE — 36415 COLL VENOUS BLD VENIPUNCTURE: CPT

## 2017-07-12 ENCOUNTER — OUTPATIENT (OUTPATIENT)
Dept: OUTPATIENT SERVICES | Facility: HOSPITAL | Age: 57
LOS: 1 days | Discharge: HOME | End: 2017-07-12
Payer: MEDICARE

## 2017-07-12 DIAGNOSIS — F41.1 GENERALIZED ANXIETY DISORDER: ICD-10-CM

## 2017-07-12 DIAGNOSIS — G47.00 INSOMNIA, UNSPECIFIED: ICD-10-CM

## 2017-07-12 DIAGNOSIS — R51 HEADACHE: ICD-10-CM

## 2017-07-12 DIAGNOSIS — E78.5 HYPERLIPIDEMIA, UNSPECIFIED: ICD-10-CM

## 2017-07-12 DIAGNOSIS — I10 ESSENTIAL (PRIMARY) HYPERTENSION: ICD-10-CM

## 2017-07-12 DIAGNOSIS — Z98.89 OTHER SPECIFIED POSTPROCEDURAL STATES: Chronic | ICD-10-CM

## 2017-07-12 DIAGNOSIS — R45.851 SUICIDAL IDEATIONS: ICD-10-CM

## 2017-07-12 DIAGNOSIS — Z87.891 PERSONAL HISTORY OF NICOTINE DEPENDENCE: ICD-10-CM

## 2017-07-12 DIAGNOSIS — F33.2 MAJOR DEPRESSIVE DISORDER, RECURRENT SEVERE WITHOUT PSYCHOTIC FEATURES: ICD-10-CM

## 2017-07-12 PROCEDURE — 90870 ELECTROCONVULSIVE THERAPY: CPT

## 2017-07-17 ENCOUNTER — OUTPATIENT (OUTPATIENT)
Dept: OUTPATIENT SERVICES | Facility: HOSPITAL | Age: 57
LOS: 1 days | Discharge: HOME | End: 2017-07-17
Payer: MEDICARE

## 2017-07-17 DIAGNOSIS — G47.33 OBSTRUCTIVE SLEEP APNEA (ADULT) (PEDIATRIC): ICD-10-CM

## 2017-07-17 DIAGNOSIS — Z98.89 OTHER SPECIFIED POSTPROCEDURAL STATES: Chronic | ICD-10-CM

## 2017-07-17 DIAGNOSIS — I10 ESSENTIAL (PRIMARY) HYPERTENSION: ICD-10-CM

## 2017-07-17 DIAGNOSIS — F33.9 MAJOR DEPRESSIVE DISORDER, RECURRENT, UNSPECIFIED: ICD-10-CM

## 2017-07-17 DIAGNOSIS — E78.5 HYPERLIPIDEMIA, UNSPECIFIED: ICD-10-CM

## 2017-07-17 PROCEDURE — 90870 ELECTROCONVULSIVE THERAPY: CPT

## 2017-07-20 DIAGNOSIS — F32.9 MAJOR DEPRESSIVE DISORDER, SINGLE EPISODE, UNSPECIFIED: ICD-10-CM

## 2017-07-20 DIAGNOSIS — E78.5 HYPERLIPIDEMIA, UNSPECIFIED: ICD-10-CM

## 2017-07-20 DIAGNOSIS — I10 ESSENTIAL (PRIMARY) HYPERTENSION: ICD-10-CM

## 2017-07-20 RX ORDER — LISDEXAMFETAMINE DIMESYLATE 70 MG/1
1 CAPSULE ORAL
Qty: 0 | Refills: 0 | COMMUNITY

## 2017-07-20 RX ORDER — LISINOPRIL 2.5 MG/1
1 TABLET ORAL
Qty: 0 | Refills: 0 | COMMUNITY

## 2017-07-20 RX ORDER — ATORVASTATIN CALCIUM 80 MG/1
1 TABLET, FILM COATED ORAL
Qty: 0 | Refills: 0 | COMMUNITY

## 2017-07-20 RX ORDER — DESVENLAFAXINE 50 MG/1
1 TABLET, EXTENDED RELEASE ORAL
Qty: 0 | Refills: 0 | COMMUNITY

## 2017-07-20 RX ORDER — QUETIAPINE FUMARATE 200 MG/1
1 TABLET, FILM COATED ORAL
Qty: 0 | Refills: 0 | COMMUNITY

## 2017-07-20 RX ORDER — OXAZEPAM 10 MG
0 CAPSULE ORAL
Qty: 0 | Refills: 0 | COMMUNITY

## 2017-07-21 ENCOUNTER — TRANSCRIPTION ENCOUNTER (OUTPATIENT)
Age: 57
End: 2017-07-21

## 2017-07-24 ENCOUNTER — OUTPATIENT (OUTPATIENT)
Dept: OUTPATIENT SERVICES | Facility: HOSPITAL | Age: 57
LOS: 1 days | Discharge: HOME | End: 2017-07-24

## 2017-07-24 DIAGNOSIS — Z98.89 OTHER SPECIFIED POSTPROCEDURAL STATES: Chronic | ICD-10-CM

## 2017-07-25 PROBLEM — I10 ESSENTIAL (PRIMARY) HYPERTENSION: Chronic | Status: ACTIVE | Noted: 2017-07-03

## 2017-07-25 PROBLEM — E78.5 HYPERLIPIDEMIA, UNSPECIFIED: Chronic | Status: ACTIVE | Noted: 2017-07-03

## 2017-07-25 PROBLEM — F33.9 MAJOR DEPRESSIVE DISORDER, RECURRENT, UNSPECIFIED: Chronic | Status: ACTIVE | Noted: 2017-07-03

## 2017-07-25 PROBLEM — F41.1 GENERALIZED ANXIETY DISORDER: Chronic | Status: ACTIVE | Noted: 2017-07-03

## 2017-07-26 ENCOUNTER — OUTPATIENT (OUTPATIENT)
Dept: OUTPATIENT SERVICES | Facility: HOSPITAL | Age: 57
LOS: 1 days | Discharge: HOME | End: 2017-07-26
Payer: MEDICARE

## 2017-07-26 DIAGNOSIS — Z98.89 OTHER SPECIFIED POSTPROCEDURAL STATES: Chronic | ICD-10-CM

## 2017-07-26 PROCEDURE — 90870 ELECTROCONVULSIVE THERAPY: CPT

## 2017-07-27 DIAGNOSIS — E78.00 PURE HYPERCHOLESTEROLEMIA, UNSPECIFIED: ICD-10-CM

## 2017-07-27 DIAGNOSIS — F33.9 MAJOR DEPRESSIVE DISORDER, RECURRENT, UNSPECIFIED: ICD-10-CM

## 2017-07-31 ENCOUNTER — OUTPATIENT (OUTPATIENT)
Dept: OUTPATIENT SERVICES | Facility: HOSPITAL | Age: 57
LOS: 1 days | Discharge: HOME | End: 2017-07-31
Payer: MEDICARE

## 2017-07-31 DIAGNOSIS — Z98.89 OTHER SPECIFIED POSTPROCEDURAL STATES: Chronic | ICD-10-CM

## 2017-07-31 DIAGNOSIS — E78.5 HYPERLIPIDEMIA, UNSPECIFIED: ICD-10-CM

## 2017-07-31 DIAGNOSIS — I10 ESSENTIAL (PRIMARY) HYPERTENSION: ICD-10-CM

## 2017-07-31 DIAGNOSIS — F33.2 MAJOR DEPRESSIVE DISORDER, RECURRENT SEVERE WITHOUT PSYCHOTIC FEATURES: ICD-10-CM

## 2017-07-31 PROCEDURE — 90870 ELECTROCONVULSIVE THERAPY: CPT

## 2017-08-02 ENCOUNTER — OUTPATIENT (OUTPATIENT)
Dept: OUTPATIENT SERVICES | Facility: HOSPITAL | Age: 57
LOS: 1 days | Discharge: HOME | End: 2017-08-02
Payer: MEDICARE

## 2017-08-02 DIAGNOSIS — Z98.89 OTHER SPECIFIED POSTPROCEDURAL STATES: Chronic | ICD-10-CM

## 2017-08-02 PROCEDURE — 90870 ELECTROCONVULSIVE THERAPY: CPT

## 2017-08-04 DIAGNOSIS — E78.5 HYPERLIPIDEMIA, UNSPECIFIED: ICD-10-CM

## 2017-08-04 DIAGNOSIS — F33.2 MAJOR DEPRESSIVE DISORDER, RECURRENT SEVERE WITHOUT PSYCHOTIC FEATURES: ICD-10-CM

## 2017-08-04 DIAGNOSIS — I10 ESSENTIAL (PRIMARY) HYPERTENSION: ICD-10-CM

## 2017-08-07 ENCOUNTER — OUTPATIENT (OUTPATIENT)
Dept: OUTPATIENT SERVICES | Facility: HOSPITAL | Age: 57
LOS: 1 days | Discharge: HOME | End: 2017-08-07

## 2017-08-07 DIAGNOSIS — I10 ESSENTIAL (PRIMARY) HYPERTENSION: ICD-10-CM

## 2017-08-07 DIAGNOSIS — Z88.1 ALLERGY STATUS TO OTHER ANTIBIOTIC AGENTS STATUS: ICD-10-CM

## 2017-08-07 DIAGNOSIS — Z98.89 OTHER SPECIFIED POSTPROCEDURAL STATES: Chronic | ICD-10-CM

## 2017-08-07 DIAGNOSIS — F33.2 MAJOR DEPRESSIVE DISORDER, RECURRENT SEVERE WITHOUT PSYCHOTIC FEATURES: ICD-10-CM

## 2017-08-07 DIAGNOSIS — Z88.2 ALLERGY STATUS TO SULFONAMIDES: ICD-10-CM

## 2017-08-10 DIAGNOSIS — I10 ESSENTIAL (PRIMARY) HYPERTENSION: ICD-10-CM

## 2017-08-10 DIAGNOSIS — F32.89 OTHER SPECIFIED DEPRESSIVE EPISODES: ICD-10-CM

## 2017-08-14 ENCOUNTER — OUTPATIENT (OUTPATIENT)
Dept: OUTPATIENT SERVICES | Facility: HOSPITAL | Age: 57
LOS: 1 days | Discharge: HOME | End: 2017-08-14
Payer: MEDICARE

## 2017-08-14 DIAGNOSIS — Z98.89 OTHER SPECIFIED POSTPROCEDURAL STATES: Chronic | ICD-10-CM

## 2017-08-14 PROCEDURE — 90870 ELECTROCONVULSIVE THERAPY: CPT

## 2017-08-16 ENCOUNTER — OUTPATIENT (OUTPATIENT)
Dept: OUTPATIENT SERVICES | Facility: HOSPITAL | Age: 57
LOS: 1 days | Discharge: HOME | End: 2017-08-16
Payer: MEDICARE

## 2017-08-16 DIAGNOSIS — Z98.89 OTHER SPECIFIED POSTPROCEDURAL STATES: Chronic | ICD-10-CM

## 2017-08-16 PROCEDURE — 90870 ELECTROCONVULSIVE THERAPY: CPT

## 2017-08-17 DIAGNOSIS — F32.9 MAJOR DEPRESSIVE DISORDER, SINGLE EPISODE, UNSPECIFIED: ICD-10-CM

## 2017-08-17 DIAGNOSIS — I10 ESSENTIAL (PRIMARY) HYPERTENSION: ICD-10-CM

## 2017-08-21 ENCOUNTER — OUTPATIENT (OUTPATIENT)
Dept: OUTPATIENT SERVICES | Facility: HOSPITAL | Age: 57
LOS: 1 days | Discharge: HOME | End: 2017-08-21
Payer: MEDICARE

## 2017-08-21 DIAGNOSIS — I10 ESSENTIAL (PRIMARY) HYPERTENSION: ICD-10-CM

## 2017-08-21 DIAGNOSIS — Z98.89 OTHER SPECIFIED POSTPROCEDURAL STATES: Chronic | ICD-10-CM

## 2017-08-21 DIAGNOSIS — Z88.1 ALLERGY STATUS TO OTHER ANTIBIOTIC AGENTS STATUS: ICD-10-CM

## 2017-08-21 DIAGNOSIS — F33.2 MAJOR DEPRESSIVE DISORDER, RECURRENT SEVERE WITHOUT PSYCHOTIC FEATURES: ICD-10-CM

## 2017-08-21 DIAGNOSIS — Z88.2 ALLERGY STATUS TO SULFONAMIDES: ICD-10-CM

## 2017-08-21 PROCEDURE — 90870 ELECTROCONVULSIVE THERAPY: CPT

## 2017-08-23 ENCOUNTER — OUTPATIENT (OUTPATIENT)
Dept: OUTPATIENT SERVICES | Facility: HOSPITAL | Age: 57
LOS: 1 days | Discharge: HOME | End: 2017-08-23
Payer: MEDICARE

## 2017-08-23 DIAGNOSIS — Z98.89 OTHER SPECIFIED POSTPROCEDURAL STATES: Chronic | ICD-10-CM

## 2017-08-23 DIAGNOSIS — Z87.891 PERSONAL HISTORY OF NICOTINE DEPENDENCE: ICD-10-CM

## 2017-08-23 DIAGNOSIS — E78.5 HYPERLIPIDEMIA, UNSPECIFIED: ICD-10-CM

## 2017-08-23 DIAGNOSIS — F33.2 MAJOR DEPRESSIVE DISORDER, RECURRENT SEVERE WITHOUT PSYCHOTIC FEATURES: ICD-10-CM

## 2017-08-23 DIAGNOSIS — F41.1 GENERALIZED ANXIETY DISORDER: ICD-10-CM

## 2017-08-23 DIAGNOSIS — I10 ESSENTIAL (PRIMARY) HYPERTENSION: ICD-10-CM

## 2017-08-23 PROCEDURE — 90870 ELECTROCONVULSIVE THERAPY: CPT

## 2017-08-28 ENCOUNTER — OUTPATIENT (OUTPATIENT)
Dept: OUTPATIENT SERVICES | Facility: HOSPITAL | Age: 57
LOS: 1 days | Discharge: HOME | End: 2017-08-28
Payer: MEDICARE

## 2017-08-28 DIAGNOSIS — I10 ESSENTIAL (PRIMARY) HYPERTENSION: ICD-10-CM

## 2017-08-28 DIAGNOSIS — Z88.1 ALLERGY STATUS TO OTHER ANTIBIOTIC AGENTS STATUS: ICD-10-CM

## 2017-08-28 DIAGNOSIS — Z98.89 OTHER SPECIFIED POSTPROCEDURAL STATES: Chronic | ICD-10-CM

## 2017-08-28 DIAGNOSIS — F33.2 MAJOR DEPRESSIVE DISORDER, RECURRENT SEVERE WITHOUT PSYCHOTIC FEATURES: ICD-10-CM

## 2017-08-28 DIAGNOSIS — Z88.2 ALLERGY STATUS TO SULFONAMIDES: ICD-10-CM

## 2017-08-28 PROCEDURE — 90870 ELECTROCONVULSIVE THERAPY: CPT

## 2017-08-30 ENCOUNTER — OUTPATIENT (OUTPATIENT)
Dept: OUTPATIENT SERVICES | Facility: HOSPITAL | Age: 57
LOS: 1 days | Discharge: HOME | End: 2017-08-30
Payer: MEDICARE

## 2017-08-30 DIAGNOSIS — Z98.89 OTHER SPECIFIED POSTPROCEDURAL STATES: Chronic | ICD-10-CM

## 2017-08-30 PROCEDURE — 90870 ELECTROCONVULSIVE THERAPY: CPT

## 2017-09-01 ENCOUNTER — OUTPATIENT (OUTPATIENT)
Dept: OUTPATIENT SERVICES | Facility: HOSPITAL | Age: 57
LOS: 1 days | Discharge: HOME | End: 2017-09-01
Payer: MEDICARE

## 2017-09-01 DIAGNOSIS — F33.9 MAJOR DEPRESSIVE DISORDER, RECURRENT, UNSPECIFIED: ICD-10-CM

## 2017-09-01 DIAGNOSIS — Z98.89 OTHER SPECIFIED POSTPROCEDURAL STATES: Chronic | ICD-10-CM

## 2017-09-04 DIAGNOSIS — Z88.1 ALLERGY STATUS TO OTHER ANTIBIOTIC AGENTS STATUS: ICD-10-CM

## 2017-09-04 DIAGNOSIS — I10 ESSENTIAL (PRIMARY) HYPERTENSION: ICD-10-CM

## 2017-09-04 DIAGNOSIS — Z88.2 ALLERGY STATUS TO SULFONAMIDES: ICD-10-CM

## 2017-09-04 DIAGNOSIS — F33.2 MAJOR DEPRESSIVE DISORDER, RECURRENT SEVERE WITHOUT PSYCHOTIC FEATURES: ICD-10-CM

## 2017-09-06 ENCOUNTER — OUTPATIENT (OUTPATIENT)
Dept: OUTPATIENT SERVICES | Facility: HOSPITAL | Age: 57
LOS: 1 days | Discharge: HOME | End: 2017-09-06

## 2017-09-06 DIAGNOSIS — Z98.89 OTHER SPECIFIED POSTPROCEDURAL STATES: Chronic | ICD-10-CM

## 2017-09-11 DIAGNOSIS — F32.9 MAJOR DEPRESSIVE DISORDER, SINGLE EPISODE, UNSPECIFIED: ICD-10-CM

## 2017-09-11 DIAGNOSIS — I10 ESSENTIAL (PRIMARY) HYPERTENSION: ICD-10-CM

## 2017-09-11 PROCEDURE — 90870 ELECTROCONVULSIVE THERAPY: CPT

## 2017-09-11 RX ORDER — MIDAZOLAM HYDROCHLORIDE 1 MG/ML
2 INJECTION, SOLUTION INTRAMUSCULAR; INTRAVENOUS ONCE
Qty: 0 | Refills: 0 | Status: DISCONTINUED | OUTPATIENT
Start: 2017-09-11 | End: 2017-09-11

## 2017-09-11 RX ADMIN — MIDAZOLAM HYDROCHLORIDE 2 MILLIGRAM(S): 1 INJECTION, SOLUTION INTRAMUSCULAR; INTRAVENOUS at 15:45

## 2017-09-18 PROCEDURE — 90870 ELECTROCONVULSIVE THERAPY: CPT

## 2017-09-18 RX ORDER — MIDAZOLAM HYDROCHLORIDE 1 MG/ML
2 INJECTION, SOLUTION INTRAMUSCULAR; INTRAVENOUS ONCE
Qty: 0 | Refills: 0 | Status: DISCONTINUED | OUTPATIENT
Start: 2017-09-18 | End: 2017-09-18

## 2017-09-18 RX ADMIN — MIDAZOLAM HYDROCHLORIDE 2 MILLIGRAM(S): 1 INJECTION, SOLUTION INTRAMUSCULAR; INTRAVENOUS at 08:20

## 2017-09-25 PROCEDURE — 90870 ELECTROCONVULSIVE THERAPY: CPT

## 2017-09-25 RX ORDER — MIDAZOLAM HYDROCHLORIDE 1 MG/ML
2 INJECTION, SOLUTION INTRAMUSCULAR; INTRAVENOUS ONCE
Qty: 0 | Refills: 0 | Status: DISCONTINUED | OUTPATIENT
Start: 2017-09-25 | End: 2017-09-25

## 2017-09-25 RX ADMIN — MIDAZOLAM HYDROCHLORIDE 2 MILLIGRAM(S): 1 INJECTION, SOLUTION INTRAMUSCULAR; INTRAVENOUS at 14:43

## 2017-10-09 PROCEDURE — 90870 ELECTROCONVULSIVE THERAPY: CPT

## 2017-10-09 RX ORDER — MIDAZOLAM HYDROCHLORIDE 1 MG/ML
2 INJECTION, SOLUTION INTRAMUSCULAR; INTRAVENOUS ONCE
Qty: 0 | Refills: 0 | Status: DISCONTINUED | OUTPATIENT
Start: 2017-10-09 | End: 2017-10-09

## 2017-10-09 RX ADMIN — MIDAZOLAM HYDROCHLORIDE 2 MILLIGRAM(S): 1 INJECTION, SOLUTION INTRAMUSCULAR; INTRAVENOUS at 14:10

## 2017-10-23 PROCEDURE — 90870 ELECTROCONVULSIVE THERAPY: CPT

## 2017-10-23 RX ORDER — MIDAZOLAM HYDROCHLORIDE 1 MG/ML
2 INJECTION, SOLUTION INTRAMUSCULAR; INTRAVENOUS ONCE
Qty: 0 | Refills: 0 | Status: DISCONTINUED | OUTPATIENT
Start: 2017-10-23 | End: 2017-10-23

## 2017-10-23 RX ADMIN — MIDAZOLAM HYDROCHLORIDE 2 MILLIGRAM(S): 1 INJECTION, SOLUTION INTRAMUSCULAR; INTRAVENOUS at 08:24

## 2017-11-20 PROCEDURE — 90870 ELECTROCONVULSIVE THERAPY: CPT

## 2017-11-20 RX ORDER — MIDAZOLAM HYDROCHLORIDE 1 MG/ML
2 INJECTION, SOLUTION INTRAMUSCULAR; INTRAVENOUS ONCE
Qty: 0 | Refills: 0 | Status: DISCONTINUED | OUTPATIENT
Start: 2017-11-20 | End: 2017-11-20

## 2017-11-20 RX ADMIN — MIDAZOLAM HYDROCHLORIDE 2 MILLIGRAM(S): 1 INJECTION, SOLUTION INTRAMUSCULAR; INTRAVENOUS at 08:10

## 2019-01-01 NOTE — ED BEHAVIORAL HEALTH ASSESSMENT NOTE - NS ED [facility]ENZODIAZEPINES
Subjective:     Chief Complaint/Reason for Admission:  Infant is a 0 days Girl Tony Light born at 39w4d  Infant female was born on 2021 at 8:02 AM via , Low Transverse.        Maternal History:  The mother is a 26 y.o.   . She  has a past medical history of Anemia, Herpes, and Migraine headache.     Prenatal Labs Review:  ABO/Rh:   Lab Results   Component Value Date/Time    GROUPTRH O POS 2019 06:20 AM    GROUPTRH O POS 2013 05:30 AM     Group B Beta Strep:   Lab Results   Component Value Date/Time    STREPBCULT (A) 2019 12:55 PM     STREPTOCOCCUS AGALACTIAE (GROUP B)  Beta-hemolytic streptococci are routinely susceptible to   penicillins,cephalosporins and carbapenems.       HIV: 2019: HIV 1/2 Ag/Ab Negative (Ref range: Negative)2013: HIV-1/HIV-2 Ab Negative (Ref range: Negative)  RPR:   Lab Results   Component Value Date/Time    RPR Non-reactive 2019 11:08 AM     Hepatitis B Surface Antigen:   Lab Results   Component Value Date/Time    HEPBSAG Negative 2019 11:30 AM     Rubella Immune Status:   Lab Results   Component Value Date/Time    RUBELLAIMMUN Reactive 2019 11:30 AM       Pregnancy/Delivery Course:  The pregnancy was complicated by history of HSV 2. Prenatal ultrasound revealed normal anatomy. Prenatal care was good. Mother received Valtrex. Membranes ruptured at delivery by AROM. The delivery was uncomplicated repeat C/S. Apgar scores   Nelson Assessment:     1 Minute:   Skin color:     Muscle tone:     Heart rate:     Breathing:     Grimace:     Total:  9          5 Minute:   Skin color:     Muscle tone:     Heart rate:     Breathing:     Grimace:     Total:  9          10 Minute:   Skin color:     Muscle tone:     Heart rate:     Breathing:     Grimace:     Total:           Living Status:       .          Review of Systems   Constitutional: Negative for activity change, appetite change, crying, decreased responsiveness, diaphoresis,  "fever and irritability.   HENT: Negative for congestion, rhinorrhea and trouble swallowing.    Eyes: Negative for discharge and redness.   Respiratory: Negative for apnea, cough, choking, wheezing and stridor.    Cardiovascular: Negative for fatigue with feeds, sweating with feeds and cyanosis.   Gastrointestinal: Negative for abdominal distention, anal bleeding, blood in stool, constipation, diarrhea and vomiting.   Genitourinary:        Normal genitalia   Musculoskeletal: Negative for extremity weakness and joint swelling.        No decreased tone.   Skin: Negative for color change (no jaundice), pallor, rash and wound.   Neurological: Negative for seizures.   Hematological: Does not bruise/bleed easily.       Objective:     Vital Signs (Most Recent)  Temp: 97.1 °F (36.2 °C) (09/30/19 0932)  Pulse: 150 (09/30/19 0932)  Resp: 60 (09/30/19 0932)    Most Recent Weight: 3430 g (7 lb 9 oz)(Filed from Delivery Summary) (09/30/19 0802)  Admission Weight: 3430 g (7 lb 9 oz)(Filed from Delivery Summary) (09/30/19 0802)  Admission  Head Circumference: 34.5 cm(Filed from Delivery Summary)   Admission Length: Height: 50.5 cm (19.88")(Filed from Delivery Summary)    Physical Exam   Constitutional: She is active. She has a strong cry. No distress.   HENT:   Head: Anterior fontanelle is flat. No cranial deformity or facial anomaly.   Nose: No nasal discharge.   Mouth/Throat: Mucous membranes are moist. Oropharynx is clear. Pharynx is normal (no cleft).   Eyes: Conjunctivae are normal.   Neck: Normal range of motion. Neck supple.   Cardiovascular: Normal rate, regular rhythm, S1 normal and S2 normal.   No murmur heard.  Pulmonary/Chest: Effort normal and breath sounds normal. No nasal flaring or stridor. No respiratory distress. She has no wheezes. She has no rales. She exhibits no retraction.   Abdominal: Soft. Bowel sounds are normal. She exhibits no distension and no mass. There is no hepatosplenomegaly. There is no " tenderness. There is no rebound and no guarding. No hernia (cord normal).   Genitourinary:   Genitourinary Comments: Normal genitalia. Anus patent   Musculoskeletal: Normal range of motion. She exhibits no edema, deformity or signs of injury (clavical intact).   No hip click   Lymphadenopathy: No occipital adenopathy is present.     She has no cervical adenopathy.   Neurological: She is alert. She has normal strength. She exhibits normal muscle tone. Suck normal. Symmetric Betty.   Skin: Skin is warm. Turgor is normal. No petechiae, no purpura and no rash noted. She is not diaphoretic. No cyanosis. No jaundice.       No results found for this or any previous visit (from the past 168 hour(s)).   Yes

## 2019-02-08 ENCOUNTER — APPOINTMENT (OUTPATIENT)
Dept: RHEUMATOLOGY | Facility: CLINIC | Age: 59
End: 2019-02-08
Payer: MEDICARE

## 2019-02-08 VITALS
TEMPERATURE: 98 F | DIASTOLIC BLOOD PRESSURE: 89 MMHG | WEIGHT: 200 LBS | OXYGEN SATURATION: 97 % | HEART RATE: 72 BPM | HEIGHT: 74 IN | SYSTOLIC BLOOD PRESSURE: 142 MMHG | BODY MASS INDEX: 25.67 KG/M2

## 2019-02-08 DIAGNOSIS — M1A.9XX0 CHRONIC GOUT, UNSPECIFIED, W/OUT TOPHUS (TOPHI): ICD-10-CM

## 2019-02-08 PROCEDURE — 99205 OFFICE O/P NEW HI 60 MIN: CPT

## 2019-02-08 PROCEDURE — 76882 US LMTD JT/FCL EVL NVASC XTR: CPT | Mod: RT

## 2019-02-09 LAB
BASOPHILS # BLD AUTO: 0.06 K/UL
BASOPHILS NFR BLD AUTO: 0.6 %
CRP SERPL-MCNC: 0.92 MG/DL
EOSINOPHIL # BLD AUTO: 0.28 K/UL
EOSINOPHIL NFR BLD AUTO: 3 %
ERYTHROCYTE [SEDIMENTATION RATE] IN BLOOD BY WESTERGREN METHOD: 17 MM/HR
HCT VFR BLD CALC: 46.9 %
HGB BLD-MCNC: 15.5 G/DL
IMM GRANULOCYTES NFR BLD AUTO: 1.3 %
LYMPHOCYTES # BLD AUTO: 3.49 K/UL
LYMPHOCYTES NFR BLD AUTO: 37.2 %
MAN DIFF?: NORMAL
MCHC RBC-ENTMCNC: 29.9 PG
MCHC RBC-ENTMCNC: 33 GM/DL
MCV RBC AUTO: 90.4 FL
MONOCYTES # BLD AUTO: 0.56 K/UL
MONOCYTES NFR BLD AUTO: 6 %
NEUTROPHILS # BLD AUTO: 4.88 K/UL
NEUTROPHILS NFR BLD AUTO: 51.9 %
PLATELET # BLD AUTO: 281 K/UL
RBC # BLD: 5.19 M/UL
RBC # FLD: 13.6 %
URATE SERPL-MCNC: 7 MG/DL
WBC # FLD AUTO: 9.39 K/UL

## 2019-02-11 LAB
ALBUMIN SERPL ELPH-MCNC: 4.4 G/DL
ALP BLD-CCNC: 77 U/L
ALT SERPL-CCNC: 149 U/L
ANION GAP SERPL CALC-SCNC: 11 MMOL/L
AST SERPL-CCNC: 471 U/L
BILIRUB SERPL-MCNC: 0.9 MG/DL
BUN SERPL-MCNC: 13 MG/DL
CALCIUM SERPL-MCNC: 9.8 MG/DL
CHLORIDE SERPL-SCNC: 102 MMOL/L
CO2 SERPL-SCNC: 29 MMOL/L
CREAT SERPL-MCNC: 0.89 MG/DL
HLA-B27 RELATED AG QL: NORMAL
POTASSIUM SERPL-SCNC: 5.9 MMOL/L
PROT SERPL-MCNC: 7.3 G/DL
SODIUM SERPL-SCNC: 142 MMOL/L

## 2019-02-12 ENCOUNTER — CLINICAL ADVICE (OUTPATIENT)
Age: 59
End: 2019-02-12

## 2019-02-14 LAB — HLA-B*57:01 QL: NEGATIVE

## 2019-02-21 ENCOUNTER — FORM ENCOUNTER (OUTPATIENT)
Age: 59
End: 2019-02-21

## 2019-02-22 ENCOUNTER — APPOINTMENT (OUTPATIENT)
Dept: MRI IMAGING | Facility: CLINIC | Age: 59
End: 2019-02-22
Payer: MEDICARE

## 2019-02-22 ENCOUNTER — OUTPATIENT (OUTPATIENT)
Dept: OUTPATIENT SERVICES | Facility: HOSPITAL | Age: 59
LOS: 1 days | End: 2019-02-22
Payer: MEDICARE

## 2019-02-22 DIAGNOSIS — Z00.8 ENCOUNTER FOR OTHER GENERAL EXAMINATION: ICD-10-CM

## 2019-02-22 DIAGNOSIS — Z98.89 OTHER SPECIFIED POSTPROCEDURAL STATES: Chronic | ICD-10-CM

## 2019-02-22 PROCEDURE — 73721 MRI JNT OF LWR EXTRE W/O DYE: CPT

## 2019-02-22 PROCEDURE — 73721 MRI JNT OF LWR EXTRE W/O DYE: CPT | Mod: 26,RT

## 2019-02-27 ENCOUNTER — APPOINTMENT (OUTPATIENT)
Dept: ORTHOPEDIC SURGERY | Facility: CLINIC | Age: 59
End: 2019-02-27
Payer: MEDICARE

## 2019-02-27 DIAGNOSIS — Z82.61 FAMILY HISTORY OF ARTHRITIS: ICD-10-CM

## 2019-02-27 DIAGNOSIS — Z78.9 OTHER SPECIFIED HEALTH STATUS: ICD-10-CM

## 2019-02-27 DIAGNOSIS — Z86.19 PERSONAL HISTORY OF OTHER INFECTIOUS AND PARASITIC DISEASES: ICD-10-CM

## 2019-02-27 DIAGNOSIS — S86.011A STRAIN OF RIGHT ACHILLES TENDON, INITIAL ENCOUNTER: ICD-10-CM

## 2019-02-27 DIAGNOSIS — M76.60 ACHILLES TENDINITIS, UNSPECIFIED LEG: ICD-10-CM

## 2019-02-27 PROCEDURE — 99204 OFFICE O/P NEW MOD 45 MIN: CPT

## 2019-02-27 NOTE — PHYSICAL EXAM
[de-identified] : General: Alert and oriented x3. In no acute distress. Pleasant in nature with a normal affect. No apparent respiratory distress.\par \par Prone Exam\par L side intact no swelling. R side loss of resting plantarflexion tone. +Mccray on the R side. \par \par R Ankle Exam\par Skin: Clean, dry and intact.\par Inspection: No obvious malalignment, no swelling, no effusion;no lymphadenopathy.  1 cm gap roughly 6 cm from the insertion. \par Pulses: 2+ DP/PT pulses\par ROM: Right: 10  degrees dorsiflexion, 40   degrees of plantarflexion,  10  degrees of subtalar motion. Left: 10 degrees dorsiflexion, 40   degrees of plantarflexion,  10  degrees of subtalar motion.\par Tenderness: + weakness but no pain. No tenderness over the lateral malleolus, no CFL/ATFL/PTFL pain. No medial malleolus pain, no deltoid ligament pain. No proximal fibular pain. No heel pain.\par Stability: Negative anterior/posterior drawer. \par Strength: 5/5 TA/GS/EHL\par Neuro: Intact to light touch throughout\par Additional tests: Negative Remy's test, negative syndesmosis squeeze test.\par  [de-identified] : MRI of the RT ankle from [facility] on 2/22/19 revealed diffusely thickened and abnormal Achilles tendon with a focal thickness tear and fluid gap.

## 2019-02-27 NOTE — DISCUSSION/SUMMARY
[de-identified] : Today in the office I had a lengthy discussion with the patient regarding his R Achilles pain. I have addressed all of the patient's concern surrounding the pathology of their conditions. We have agreed to move forward with the conservative treatment at this time. Therefore, I suggested he continue with his activities as tolerated due to only having weakness and no pain. I suggested to the patient that he  work with his ROM, strengthening, home exercises, and stretching. I suggested pt continue using the gel heel cups and update them every 2-3 months. The pt is to call me as soon as possible if they notice any worsening pain or symptoms. I would like to follow up with the patient prn. All questions were answered and the patient verbalized understanding. The patient is in agreement with this treatment plan.\par \par
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
## 2019-02-27 NOTE — CONSULT LETTER
[Consult Letter:] : I had the pleasure of evaluating your patient, [unfilled]. [Consult Closing:] : Thank you very much for allowing me to participate in the care of this patient.  If you have any questions, please do not hesitate to contact me. [Sincerely,] : Sincerely, [FreeTextEntry2] : Dr Shelby Mcmahon [FreeTextEntry3] : Luis Jung\par

## 2019-03-18 ENCOUNTER — APPOINTMENT (OUTPATIENT)
Dept: RHEUMATOLOGY | Facility: CLINIC | Age: 59
End: 2019-03-18

## 2020-01-27 NOTE — ADDENDUM
[FreeTextEntry1] : Documented by Isela Morfin acting as a scribe for Dr. Jung on 02/27/2019 \par \par All medical record entries made by the Scribe were at my, Dr. Landaverde, direction and\par personally dictated by me on 02/27/2019 . I have reviewed the chart and agree that the record\par accurately reflects my personal performance of the history, physical exam, procedure and imaging.  Detail Level: Detailed Render Note In Bullet Format When Appropriate: No Medical Necessity Clause: This procedure was medically necessary because the lesions that were treated were: Medical Necessity Information: It is in your best interest to select a reason for this procedure from the list below. All of these items fulfill various CMS LCD requirements except the new and changing color options. Consent: The patient's consent was obtained including but not limited to risks of crusting, scabbing, blistering, scarring, darker or lighter pigmentary change, recurrence, incomplete removal and infection. Post-Care Instructions: I reviewed with the patient in detail post-care instructions. Patient is to wear sunprotection, and avoid picking at any of the treated lesions. Pt may apply Vaseline to crusted or scabbing areas.

## 2020-07-14 ENCOUNTER — APPOINTMENT (OUTPATIENT)
Dept: OPHTHALMOLOGY | Facility: CLINIC | Age: 60
End: 2020-07-14
Payer: MEDICARE

## 2020-07-14 ENCOUNTER — NON-APPOINTMENT (OUTPATIENT)
Age: 60
End: 2020-07-14

## 2020-07-14 PROCEDURE — 92060 SENSORIMOTOR EXAMINATION: CPT

## 2020-07-14 PROCEDURE — V2718: CPT

## 2020-07-14 PROCEDURE — 92004 COMPRE OPH EXAM NEW PT 1/>: CPT

## 2020-09-11 ENCOUNTER — TRANSCRIPTION ENCOUNTER (OUTPATIENT)
Age: 60
End: 2020-09-11

## 2020-09-15 ENCOUNTER — NON-APPOINTMENT (OUTPATIENT)
Age: 60
End: 2020-09-15

## 2020-09-15 ENCOUNTER — APPOINTMENT (OUTPATIENT)
Dept: OPHTHALMOLOGY | Facility: CLINIC | Age: 60
End: 2020-09-15
Payer: MEDICARE

## 2020-09-15 PROCEDURE — 92012 INTRM OPH EXAM EST PATIENT: CPT

## 2021-02-09 ENCOUNTER — APPOINTMENT (OUTPATIENT)
Dept: NEUROLOGY | Facility: CLINIC | Age: 61
End: 2021-02-09
Payer: MEDICARE

## 2021-02-09 PROCEDURE — 99204 OFFICE O/P NEW MOD 45 MIN: CPT | Mod: 95

## 2021-02-10 RX ORDER — PREGABALIN 150 MG/1
150 CAPSULE ORAL
Refills: 0 | Status: ACTIVE | COMMUNITY
Start: 2021-01-29

## 2021-02-10 RX ORDER — VALACYCLOVIR 500 MG/1
500 TABLET, FILM COATED ORAL
Refills: 0 | Status: DISCONTINUED | COMMUNITY
End: 2021-02-10

## 2021-02-10 RX ORDER — DESVENLAFAXINE 50 MG/1
50 TABLET, EXTENDED RELEASE ORAL
Qty: 30 | Refills: 0 | Status: DISCONTINUED | COMMUNITY
Start: 2020-12-08 | End: 2021-02-10

## 2021-02-10 RX ORDER — THYROID 120 MG/1
120 TABLET ORAL
Refills: 0 | Status: DISCONTINUED | COMMUNITY
End: 2021-02-10

## 2021-02-10 RX ORDER — ATENOLOL 50 MG/1
50 TABLET ORAL
Refills: 0 | Status: DISCONTINUED | COMMUNITY
End: 2021-02-10

## 2021-02-10 RX ORDER — MODAFINIL 200 MG/1
200 TABLET ORAL
Refills: 0 | Status: DISCONTINUED | COMMUNITY
End: 2021-02-10

## 2021-02-10 RX ORDER — ESCITALOPRAM OXALATE 20 MG/1
20 TABLET, FILM COATED ORAL
Refills: 0 | Status: DISCONTINUED | COMMUNITY
End: 2021-02-10

## 2021-02-10 RX ORDER — PREGABALIN 75 MG/1
75 CAPSULE ORAL
Qty: 60 | Refills: 0 | Status: DISCONTINUED | COMMUNITY
Start: 2020-09-09 | End: 2021-02-10

## 2021-02-10 RX ORDER — LISDEXAMFETAMINE DIMESYLATE 70 MG/1
70 CAPSULE ORAL
Qty: 30 | Refills: 0 | Status: ACTIVE | COMMUNITY
Start: 2021-02-03

## 2021-02-10 RX ORDER — ESLICARBAZEPINE ACETATE 400 MG/1
400 TABLET ORAL
Qty: 30 | Refills: 0 | Status: DISCONTINUED | COMMUNITY
Start: 2021-01-15 | End: 2021-02-10

## 2021-02-10 RX ORDER — PENICILLIN G BENZATHINE AND PENICILLIN G PROCAINE 600000; 600000 [IU]/2ML; [IU]/2ML
INJECTION, SUSPENSION INTRAMUSCULAR
Refills: 0 | Status: DISCONTINUED | COMMUNITY
End: 2021-02-10

## 2021-02-10 RX ORDER — DULOXETINE HYDROCHLORIDE 60 MG/1
60 CAPSULE, DELAYED RELEASE PELLETS ORAL
Refills: 0 | Status: ACTIVE | COMMUNITY
Start: 2021-01-31

## 2021-02-10 RX ORDER — AZITHROMYCIN 500 MG/1
500 TABLET, FILM COATED ORAL
Refills: 0 | Status: DISCONTINUED | COMMUNITY
End: 2021-02-10

## 2021-02-10 RX ORDER — OXCARBAZEPINE 150 MG/1
150 TABLET, FILM COATED ORAL
Refills: 0 | Status: ACTIVE | COMMUNITY
Start: 2021-02-03

## 2021-02-10 RX ORDER — CLONAZEPAM 0.5 MG/1
0.5 TABLET ORAL 3 TIMES DAILY
Refills: 0 | Status: ACTIVE | COMMUNITY

## 2021-02-10 RX ORDER — MUPIROCIN 20 MG/G
2 OINTMENT TOPICAL
Qty: 22 | Refills: 0 | Status: DISCONTINUED | COMMUNITY
Start: 2021-01-02 | End: 2021-02-10

## 2021-02-10 RX ORDER — BUPRENORPHINE HYDROCHLORIDE 2 MG/1
2 TABLET SUBLINGUAL 3 TIMES DAILY
Refills: 0 | Status: ACTIVE | COMMUNITY

## 2021-02-10 RX ORDER — ATORVASTATIN CALCIUM 40 MG/1
40 TABLET, FILM COATED ORAL
Refills: 0 | Status: ACTIVE | COMMUNITY
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
## 2021-02-26 ENCOUNTER — NON-APPOINTMENT (OUTPATIENT)
Age: 61
End: 2021-02-26

## 2021-04-09 ENCOUNTER — APPOINTMENT (OUTPATIENT)
Dept: NEUROLOGY | Facility: CLINIC | Age: 61
End: 2021-04-09
Payer: MEDICARE

## 2021-04-09 DIAGNOSIS — Z91.89 OTHER SPECIFIED PERSONAL RISK FACTORS, NOT ELSEWHERE CLASSIFIED: ICD-10-CM

## 2021-04-09 DIAGNOSIS — F07.81 POSTCONCUSSIONAL SYNDROME: ICD-10-CM

## 2021-04-09 PROCEDURE — 99214 OFFICE O/P EST MOD 30 MIN: CPT | Mod: 95
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
## 2021-04-27 ENCOUNTER — APPOINTMENT (OUTPATIENT)
Dept: NEUROLOGY | Facility: CLINIC | Age: 61
End: 2021-04-27

## 2021-05-04 ENCOUNTER — APPOINTMENT (OUTPATIENT)
Dept: NEUROLOGY | Facility: CLINIC | Age: 61
End: 2021-05-04

## 2021-06-03 ENCOUNTER — APPOINTMENT (OUTPATIENT)
Dept: NEUROLOGY | Facility: CLINIC | Age: 61
End: 2021-06-03

## 2021-09-10 ENCOUNTER — NON-APPOINTMENT (OUTPATIENT)
Age: 61
End: 2021-09-10

## 2021-09-30 ENCOUNTER — LABORATORY RESULT (OUTPATIENT)
Age: 61
End: 2021-09-30

## 2021-09-30 ENCOUNTER — APPOINTMENT (OUTPATIENT)
Dept: HEPATOLOGY | Facility: CLINIC | Age: 61
End: 2021-09-30
Payer: MEDICARE

## 2021-09-30 VITALS
TEMPERATURE: 97.7 F | SYSTOLIC BLOOD PRESSURE: 153 MMHG | DIASTOLIC BLOOD PRESSURE: 87 MMHG | BODY MASS INDEX: 28.62 KG/M2 | HEART RATE: 77 BPM | WEIGHT: 223 LBS | OXYGEN SATURATION: 95 % | HEIGHT: 74 IN

## 2021-09-30 DIAGNOSIS — E78.5 HYPERLIPIDEMIA, UNSPECIFIED: ICD-10-CM

## 2021-09-30 DIAGNOSIS — F32.9 MAJOR DEPRESSIVE DISORDER, SINGLE EPISODE, UNSPECIFIED: ICD-10-CM

## 2021-09-30 DIAGNOSIS — R60.0 LOCALIZED EDEMA: ICD-10-CM

## 2021-09-30 DIAGNOSIS — R63.5 ABNORMAL WEIGHT GAIN: ICD-10-CM

## 2021-09-30 DIAGNOSIS — R68.89 OTHER GENERAL SYMPTOMS AND SIGNS: ICD-10-CM

## 2021-09-30 DIAGNOSIS — F48.8 OTHER SPECIFIED NONPSYCHOTIC MENTAL DISORDERS: ICD-10-CM

## 2021-09-30 DIAGNOSIS — E66.3 OVERWEIGHT: ICD-10-CM

## 2021-09-30 PROCEDURE — 99204 OFFICE O/P NEW MOD 45 MIN: CPT
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
## 2021-10-05 ENCOUNTER — APPOINTMENT (OUTPATIENT)
Dept: HEPATOLOGY | Facility: CLINIC | Age: 61
End: 2021-10-05
Payer: MEDICARE

## 2021-10-05 DIAGNOSIS — R79.89 OTHER SPECIFIED ABNORMAL FINDINGS OF BLOOD CHEMISTRY: ICD-10-CM

## 2021-10-05 PROCEDURE — 91200 LIVER ELASTOGRAPHY: CPT

## 2021-10-11 ENCOUNTER — NON-APPOINTMENT (OUTPATIENT)
Age: 61
End: 2021-10-11

## 2021-11-19 ENCOUNTER — APPOINTMENT (OUTPATIENT)
Dept: HEPATOLOGY | Facility: CLINIC | Age: 61
End: 2021-11-19

## 2022-03-09 NOTE — ED BEHAVIORAL HEALTH ASSESSMENT NOTE - NS ED BHA AXIS I PRIMARY CODE FT
Detail Level: Zone
Render In Strict Bullet Format?: No
Plan: Discussed otc moisturizers including cerave moisturizing cream. Also discussed ammonium lactate rx.
F33.2

## 2024-05-03 ENCOUNTER — APPOINTMENT (OUTPATIENT)
Dept: UROLOGY | Facility: CLINIC | Age: 64
End: 2024-05-03
Payer: MEDICARE

## 2024-05-03 VITALS
DIASTOLIC BLOOD PRESSURE: 84 MMHG | WEIGHT: 212 LBS | BODY MASS INDEX: 27.21 KG/M2 | HEIGHT: 74 IN | SYSTOLIC BLOOD PRESSURE: 131 MMHG

## 2024-05-03 PROCEDURE — 99204 OFFICE O/P NEW MOD 45 MIN: CPT

## 2024-05-04 NOTE — PHYSICAL EXAM
[General Appearance - Well Developed] : well developed [General Appearance - Well Nourished] : well nourished [Normal Appearance] : normal appearance [Well Groomed] : well groomed [Edema] : no peripheral edema [] : no respiratory distress [Urethral Meatus] : meatus normal [Abdomen Soft] : soft [Penis Abnormality] : normal circumcised penis [Testes Mass (___cm)] : there were no testicular masses [Normal Station and Gait] : the gait and station were normal for the patient's age [Skin Color & Pigmentation] : normal skin color and pigmentation [No Focal Deficits] : no focal deficits [Oriented To Time, Place, And Person] : oriented to person, place, and time [Affect] : the affect was normal [Mood] : the mood was normal [Not Anxious] : not anxious [de-identified] : No Peyronies plaques

## 2024-05-04 NOTE — ASSESSMENT
[FreeTextEntry1] : 64M w depression, HTN, HLD, and DM2 presents for ED and penile curvature. 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 We had a long discussion about the nature of these conditions and the management options. We agreed to focus on lifestyle modification and try ICI. I do not expect the congenital penile curvature to be an issue.  -Diet, exercise, HTN/HLD/DM2 control -Refer to Natalie for ICI

## 2024-05-04 NOTE — HISTORY OF PRESENT ILLNESS
[FreeTextEntry1] : 64M w depression, HTN, HLD, and DM2 presents for ED and penile curvature.  2016, sildenafil 100mg helped, but it caused a headache. He continued to use the sildenafil, but it gradually stopped working after a few years. He also tried tadalafil 20mg PRN, but this also caused a headache and was less effective.  2019, tried TRT for 8 weeks but stopped because of testicular atrophy and minimal benefits.  Today, he reports that he has not been able to have adequate erections since ~2020, and this is very bothersome to him. He also reports a 25 degree dorsal curvature that has never been an issue. He is hoping to date again, but he is embarrassed about his ED.

## 2024-05-13 ENCOUNTER — APPOINTMENT (OUTPATIENT)
Dept: UROLOGY | Facility: CLINIC | Age: 64
End: 2024-05-13